# Patient Record
Sex: MALE | Race: OTHER | URBAN - METROPOLITAN AREA
[De-identification: names, ages, dates, MRNs, and addresses within clinical notes are randomized per-mention and may not be internally consistent; named-entity substitution may affect disease eponyms.]

---

## 2018-05-11 ENCOUNTER — OUTPATIENT (OUTPATIENT)
Dept: OUTPATIENT SERVICES | Facility: HOSPITAL | Age: 73
LOS: 1 days | End: 2018-05-11
Payer: COMMERCIAL

## 2018-05-11 VITALS
HEIGHT: 67.5 IN | DIASTOLIC BLOOD PRESSURE: 80 MMHG | OXYGEN SATURATION: 99 % | HEART RATE: 75 BPM | TEMPERATURE: 98 F | SYSTOLIC BLOOD PRESSURE: 125 MMHG | WEIGHT: 144.4 LBS | RESPIRATION RATE: 15 BRPM

## 2018-05-11 DIAGNOSIS — M75.122 COMPLETE ROTATOR CUFF TEAR OR RUPTURE OF LEFT SHOULDER, NOT SPECIFIED AS TRAUMATIC: ICD-10-CM

## 2018-05-11 DIAGNOSIS — M25.512 PAIN IN LEFT SHOULDER: ICD-10-CM

## 2018-05-11 DIAGNOSIS — M75.42 IMPINGEMENT SYNDROME OF LEFT SHOULDER: ICD-10-CM

## 2018-05-11 DIAGNOSIS — Z90.49 ACQUIRED ABSENCE OF OTHER SPECIFIED PARTS OF DIGESTIVE TRACT: Chronic | ICD-10-CM

## 2018-05-11 DIAGNOSIS — Z01.818 ENCOUNTER FOR OTHER PREPROCEDURAL EXAMINATION: ICD-10-CM

## 2018-05-11 LAB
ANION GAP SERPL CALC-SCNC: 5 MMOL/L — SIGNIFICANT CHANGE UP (ref 5–17)
BUN SERPL-MCNC: 15 MG/DL — SIGNIFICANT CHANGE UP (ref 7–23)
CALCIUM SERPL-MCNC: 9.1 MG/DL — SIGNIFICANT CHANGE UP (ref 8.4–10.5)
CHLORIDE SERPL-SCNC: 103 MMOL/L — SIGNIFICANT CHANGE UP (ref 96–108)
CO2 SERPL-SCNC: 30 MMOL/L — SIGNIFICANT CHANGE UP (ref 22–31)
CREAT SERPL-MCNC: 1.05 MG/DL — SIGNIFICANT CHANGE UP (ref 0.5–1.3)
GLUCOSE SERPL-MCNC: 76 MG/DL — SIGNIFICANT CHANGE UP (ref 70–99)
HCT VFR BLD CALC: 43.4 % — SIGNIFICANT CHANGE UP (ref 39–50)
HGB BLD-MCNC: 14.9 G/DL — SIGNIFICANT CHANGE UP (ref 13–17)
MCHC RBC-ENTMCNC: 29.9 PG — SIGNIFICANT CHANGE UP (ref 27–34)
MCHC RBC-ENTMCNC: 34.4 GM/DL — SIGNIFICANT CHANGE UP (ref 32–36)
MCV RBC AUTO: 87 FL — SIGNIFICANT CHANGE UP (ref 80–100)
PLATELET # BLD AUTO: 168 K/UL — SIGNIFICANT CHANGE UP (ref 150–400)
POTASSIUM SERPL-MCNC: 4.3 MMOL/L — SIGNIFICANT CHANGE UP (ref 3.5–5.3)
POTASSIUM SERPL-SCNC: 4.3 MMOL/L — SIGNIFICANT CHANGE UP (ref 3.5–5.3)
RBC # BLD: 4.99 M/UL — SIGNIFICANT CHANGE UP (ref 4.2–5.8)
RBC # FLD: 13 % — SIGNIFICANT CHANGE UP (ref 10.3–14.5)
SODIUM SERPL-SCNC: 138 MMOL/L — SIGNIFICANT CHANGE UP (ref 135–145)
WBC # BLD: 11.3 K/UL — HIGH (ref 3.8–10.5)
WBC # FLD AUTO: 11.3 K/UL — HIGH (ref 3.8–10.5)

## 2018-05-11 PROCEDURE — G0463: CPT

## 2018-05-11 PROCEDURE — 85027 COMPLETE CBC AUTOMATED: CPT

## 2018-05-11 PROCEDURE — 36415 COLL VENOUS BLD VENIPUNCTURE: CPT

## 2018-05-11 PROCEDURE — 93010 ELECTROCARDIOGRAM REPORT: CPT | Mod: NC

## 2018-05-11 PROCEDURE — 93005 ELECTROCARDIOGRAM TRACING: CPT

## 2018-05-11 PROCEDURE — 80048 BASIC METABOLIC PNL TOTAL CA: CPT

## 2018-05-11 RX ORDER — CHLORHEXIDINE GLUCONATE 213 G/1000ML
1 SOLUTION TOPICAL ONCE
Qty: 0 | Refills: 0 | Status: DISCONTINUED | OUTPATIENT
Start: 2018-05-11 | End: 2018-05-26

## 2018-05-11 NOTE — H&P PST ADULT - FAMILY HISTORY
Child  Still living? Yes, Estimated age: 41-50  Family history of rheumatoid arthritis, Age at diagnosis: Age Unknown
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
## 2018-05-14 RX ORDER — CHLORHEXIDINE GLUCONATE 213 G/1000ML
1 SOLUTION TOPICAL ONCE
Qty: 0 | Refills: 0 | Status: COMPLETED | OUTPATIENT
Start: 2018-05-18 | End: 2018-05-18

## 2018-05-15 NOTE — ASU DISCHARGE PLAN (ADULT/PEDIATRIC). - SPECIAL INSTRUCTIONS
Vertical stick exercises.Fully open and close hand multiple times daily.May remove sling to flex and extend arm at elbow.  Do not raise arm over shoulder    Physical therapy 5X/week for first week, then 4X/week for next two weeks, then 2X/week for last 3 weeks.  Leave dressing intact for 48 hours. If saturated, change dressing. Wear sling at all times.     Vertical stick exercises. Fully open and close hand multiple times daily. May remove sling to flex and extend arm at elbow. OK with forward flexion with elbow bend, no Abduction no external rotation.  Do not raise arm over shoulder    Physical therapy 5X/week for first week, then 4X/week for next two weeks, then 2X/week for last 3 weeks.  Leave dressing intact for 48 hours. If saturated, change dressing.

## 2018-05-15 NOTE — ASU DISCHARGE PLAN (ADULT/PEDIATRIC). - ACTIVITY LEVEL
no tub baths/elevate extremity/No abduction No external rotation of arm/no heavy lifting/no sports/gym/no weight bearing

## 2018-05-15 NOTE — ASU DISCHARGE PLAN (ADULT/PEDIATRIC). - NOTIFY
Swelling that continues/Fever greater than 101/Numbness, tingling/Bleeding that does not stop/Numbness, color, or temperature change to extremity

## 2018-05-15 NOTE — ASU DISCHARGE PLAN (ADULT/PEDIATRIC). - DRESSING FT
clean with alcohol, shower and apply bandaids to incisions clean with alcohol, shower and apply no occlusive bandaids to incisions

## 2018-05-15 NOTE — ASU DISCHARGE PLAN (ADULT/PEDIATRIC). - MEDICATION SUMMARY - MEDICATIONS TO TAKE
I will START or STAY ON the medications listed below when I get home from the hospital:    Percocet 5/325 oral tablet  -- 1 tab(s) by mouth every 4 hours, As Needed -for severe pain MDD:6   -- Caution federal law prohibits the transfer of this drug to any person other  than the person for whom it was prescribed.  May cause drowsiness.  Alcohol may intensify this effect.  Use care when operating dangerous machinery.  This prescription cannot be refilled.  This product contains acetaminophen.  Do not use  with any other product containing acetaminophen to prevent possible liver damage.  Using more of this medication than prescribed may cause serious breathing problems.    -- Indication: For pain    zolpidem 5 mg oral tablet  -- 1 tab(s) by mouth once a day (at bedtime), As Needed -for insomnia MDD:1    -- Caution federal law prohibits the transfer of this drug to any person other  than the person for whom it was prescribed.  May cause drowsiness.  Alcohol may intensify this effect.  Use care when operating dangerous machinery.    -- Indication: For pain

## 2018-05-17 ENCOUNTER — TRANSCRIPTION ENCOUNTER (OUTPATIENT)
Age: 73
End: 2018-05-17

## 2018-05-18 ENCOUNTER — OUTPATIENT (OUTPATIENT)
Dept: OUTPATIENT SERVICES | Facility: HOSPITAL | Age: 73
LOS: 1 days | End: 2018-05-18
Payer: COMMERCIAL

## 2018-05-18 VITALS
OXYGEN SATURATION: 99 % | DIASTOLIC BLOOD PRESSURE: 66 MMHG | SYSTOLIC BLOOD PRESSURE: 128 MMHG | HEART RATE: 80 BPM | RESPIRATION RATE: 15 BRPM

## 2018-05-18 VITALS
RESPIRATION RATE: 11 BRPM | HEIGHT: 68 IN | HEART RATE: 72 BPM | OXYGEN SATURATION: 100 % | TEMPERATURE: 98 F | SYSTOLIC BLOOD PRESSURE: 138 MMHG | WEIGHT: 139.77 LBS | DIASTOLIC BLOOD PRESSURE: 69 MMHG

## 2018-05-18 DIAGNOSIS — Z90.49 ACQUIRED ABSENCE OF OTHER SPECIFIED PARTS OF DIGESTIVE TRACT: Chronic | ICD-10-CM

## 2018-05-18 DIAGNOSIS — M75.122 COMPLETE ROTATOR CUFF TEAR OR RUPTURE OF LEFT SHOULDER, NOT SPECIFIED AS TRAUMATIC: ICD-10-CM

## 2018-05-18 DIAGNOSIS — M75.42 IMPINGEMENT SYNDROME OF LEFT SHOULDER: ICD-10-CM

## 2018-05-18 PROCEDURE — C1713: CPT

## 2018-05-18 PROCEDURE — 29827 SHO ARTHRS SRG RT8TR CUF RPR: CPT | Mod: LT

## 2018-05-18 PROCEDURE — 88304 TISSUE EXAM BY PATHOLOGIST: CPT

## 2018-05-18 PROCEDURE — 88304 TISSUE EXAM BY PATHOLOGIST: CPT | Mod: 26

## 2018-05-18 PROCEDURE — 29824 SHO ARTHRS SRG DSTL CLAVICLC: CPT | Mod: LT

## 2018-05-18 PROCEDURE — 29826 SHO ARTHRS SRG DECOMPRESSION: CPT | Mod: LT

## 2018-05-18 RX ORDER — HYDROMORPHONE HYDROCHLORIDE 2 MG/ML
0.5 INJECTION INTRAMUSCULAR; INTRAVENOUS; SUBCUTANEOUS
Qty: 0 | Refills: 0 | Status: DISCONTINUED | OUTPATIENT
Start: 2018-05-18 | End: 2018-05-18

## 2018-05-18 RX ORDER — ZOLPIDEM TARTRATE 10 MG/1
1 TABLET ORAL
Qty: 14 | Refills: 0 | OUTPATIENT
Start: 2018-05-18 | End: 2018-05-31

## 2018-05-18 RX ORDER — CEFAZOLIN SODIUM 1 G
2000 VIAL (EA) INJECTION ONCE
Qty: 0 | Refills: 0 | Status: COMPLETED | OUTPATIENT
Start: 2018-05-18 | End: 2018-05-18

## 2018-05-18 RX ORDER — OXYCODONE HYDROCHLORIDE 5 MG/1
5 TABLET ORAL ONCE
Qty: 0 | Refills: 0 | Status: DISCONTINUED | OUTPATIENT
Start: 2018-05-18 | End: 2018-05-18

## 2018-05-18 RX ORDER — ONDANSETRON 8 MG/1
4 TABLET, FILM COATED ORAL ONCE
Qty: 0 | Refills: 0 | Status: DISCONTINUED | OUTPATIENT
Start: 2018-05-18 | End: 2018-05-18

## 2018-05-18 RX ORDER — SODIUM CHLORIDE 9 MG/ML
1000 INJECTION, SOLUTION INTRAVENOUS
Qty: 0 | Refills: 0 | Status: DISCONTINUED | OUTPATIENT
Start: 2018-05-18 | End: 2018-05-18

## 2018-05-18 RX ADMIN — CHLORHEXIDINE GLUCONATE 1 APPLICATION(S): 213 SOLUTION TOPICAL at 09:29

## 2018-05-18 NOTE — BRIEF OPERATIVE NOTE - PROCEDURE
<<-----Click on this checkbox to enter Procedure Shoulder arthroscopy  05/18/2018  rotator cuff repair, subacromial decompression, synovectomy  Active  SHARON

## 2018-11-13 NOTE — H&P PST ADULT - VENOUS THROMBOEMBOLISM CURRENT STATUS
11/13/18 0622   Provider Notification   Provider Name/Title Dr. Amaro    Method of Notification Phone   Request Evaluate - Remote   Notification Reason Status Update     Notified provider that patient continues to have an elevated hr of 120. Fasting BG this am was 152. No concerns at this time.    minor surgery planned

## 2020-01-02 NOTE — H&P PST ADULT - OPH GEN HX ROS MEA POS PC
Medications:  Continuous Infusions:  Scheduled Meds:   aspirin  81 mg Oral Daily    atorvastatin  40 mg Oral Daily    docusate sodium  50 mg Oral Daily    lidocaine (PF) 10 mg/ml (1%)  1 mL Other Once    magnesium oxide  800 mg Oral Once    pregabalin  100 mg Oral BID    vancomycin (VANCOCIN) IVPB  15 mg/kg (Dosing Weight) Intravenous Q12H     PRN Meds:acetaminophen, acetaminophen, Dextrose 10% Bolus, glucagon (human recombinant), HYDROmorphone, insulin aspart U-100, melatonin, ondansetron, oxyCODONE, sodium chloride 0.9%, Pharmacy to dose Vancomycin consult **AND** vancomycin - pharmacy to dose     Objective:     Vital Signs (Most Recent):  Temp: 98.4 °F (36.9 °C) (01/02/20 0514)  Pulse: 88 (01/02/20 0749)  Resp: 19 (01/02/20 0749)  BP: 128/78 (01/02/20 0749)  SpO2: 96 % (01/02/20 0749) Vital Signs (24h Range):  Temp:  [97.2 °F (36.2 °C)-100 °F (37.8 °C)] 98.4 °F (36.9 °C)  Pulse:  [] 88  Resp:  [16-19] 19  SpO2:  [93 %-100 %] 96 %  BP: (128-171)/(69-89) 128/78         Physical Exam   Constitutional: He is oriented to person, place, and time. He appears well-developed and well-nourished.   HENT:   Head: Normocephalic and atraumatic.   Cardiovascular: Normal rate.   Pulmonary/Chest: Effort normal.   Abdominal: Soft.   Musculoskeletal:   Bilateral wound vacs to groin incisions to adequate suction    1+ palpable L PT, biphasic L DP  Multiphasic R DP/PT   Neurological: He is alert and oriented to person, place, and time.   Skin: Skin is warm and dry.   Nursing note and vitals reviewed.      Significant Labs:  BMP:   Recent Labs   Lab 01/02/20  0430   *   *   K 4.0   CL 99   CO2 26   BUN 10   CREATININE 0.8   CALCIUM 9.3   MG 1.8     CBC:   Recent Labs   Lab 01/02/20  0430   WBC 8.49   RBC 4.32*   HGB 13.4*   HCT 39.7*      MCV 92   MCH 31.0   MCHC 33.8       Significant Diagnostics:  I have reviewed all pertinent imaging results/findings within the past 24 hours.   right eye vision problems and redness

## 2023-01-18 ENCOUNTER — INPATIENT (INPATIENT)
Facility: HOSPITAL | Age: 78
LOS: 7 days | Discharge: HOME | DRG: 291 | End: 2023-01-26
Attending: INTERNAL MEDICINE | Admitting: HOSPITALIST
Payer: SELF-PAY

## 2023-01-18 VITALS
DIASTOLIC BLOOD PRESSURE: 92 MMHG | SYSTOLIC BLOOD PRESSURE: 175 MMHG | RESPIRATION RATE: 18 BRPM | OXYGEN SATURATION: 78 % | HEART RATE: 144 BPM | TEMPERATURE: 98 F

## 2023-01-18 DIAGNOSIS — E87.70 FLUID OVERLOAD, UNSPECIFIED: ICD-10-CM

## 2023-01-18 LAB
ALBUMIN SERPL ELPH-MCNC: 3.6 G/DL — SIGNIFICANT CHANGE UP (ref 3.3–5.2)
ALP SERPL-CCNC: 159 U/L — HIGH (ref 40–120)
ALT FLD-CCNC: 8 U/L — SIGNIFICANT CHANGE UP
ANION GAP SERPL CALC-SCNC: 13 MMOL/L — SIGNIFICANT CHANGE UP (ref 5–17)
AST SERPL-CCNC: 16 U/L — SIGNIFICANT CHANGE UP
BASOPHILS # BLD AUTO: 0 K/UL — SIGNIFICANT CHANGE UP (ref 0–0.2)
BASOPHILS NFR BLD AUTO: 0 % — SIGNIFICANT CHANGE UP (ref 0–2)
BILIRUB SERPL-MCNC: 0.3 MG/DL — LOW (ref 0.4–2)
BUN SERPL-MCNC: 21.5 MG/DL — HIGH (ref 8–20)
CALCIUM SERPL-MCNC: 8.1 MG/DL — LOW (ref 8.4–10.5)
CHLORIDE SERPL-SCNC: 104 MMOL/L — SIGNIFICANT CHANGE UP (ref 96–108)
CO2 SERPL-SCNC: 22 MMOL/L — SIGNIFICANT CHANGE UP (ref 22–29)
CREAT SERPL-MCNC: 3.05 MG/DL — HIGH (ref 0.5–1.3)
D DIMER BLD IA.RAPID-MCNC: 789 NG/ML DDU — HIGH
EGFR: 20 ML/MIN/1.73M2 — LOW
EOSINOPHIL # BLD AUTO: 0.29 K/UL — SIGNIFICANT CHANGE UP (ref 0–0.5)
EOSINOPHIL NFR BLD AUTO: 1.8 % — SIGNIFICANT CHANGE UP (ref 0–6)
GIANT PLATELETS BLD QL SMEAR: PRESENT — SIGNIFICANT CHANGE UP
GLUCOSE SERPL-MCNC: 176 MG/DL — HIGH (ref 70–99)
HCT VFR BLD CALC: 34.8 % — LOW (ref 39–50)
HGB BLD-MCNC: 10.9 G/DL — LOW (ref 13–17)
LYMPHOCYTES # BLD AUTO: 39.5 % — SIGNIFICANT CHANGE UP (ref 13–44)
LYMPHOCYTES # BLD AUTO: 6.32 K/UL — HIGH (ref 1–3.3)
MAGNESIUM SERPL-MCNC: 2.2 MG/DL — SIGNIFICANT CHANGE UP (ref 1.6–2.6)
MANUAL SMEAR VERIFICATION: SIGNIFICANT CHANGE UP
MCHC RBC-ENTMCNC: 27.5 PG — SIGNIFICANT CHANGE UP (ref 27–34)
MCHC RBC-ENTMCNC: 31.3 GM/DL — LOW (ref 32–36)
MCV RBC AUTO: 87.9 FL — SIGNIFICANT CHANGE UP (ref 80–100)
MONOCYTES # BLD AUTO: 0.42 K/UL — SIGNIFICANT CHANGE UP (ref 0–0.9)
MONOCYTES NFR BLD AUTO: 2.6 % — SIGNIFICANT CHANGE UP (ref 2–14)
NEUTROPHILS # BLD AUTO: 8.56 K/UL — HIGH (ref 1.8–7.4)
NEUTROPHILS NFR BLD AUTO: 53.5 % — SIGNIFICANT CHANGE UP (ref 43–77)
NT-PROBNP SERPL-SCNC: 6457 PG/ML — HIGH (ref 0–300)
PLAT MORPH BLD: NORMAL — SIGNIFICANT CHANGE UP
PLATELET # BLD AUTO: 343 K/UL — SIGNIFICANT CHANGE UP (ref 150–400)
POTASSIUM SERPL-MCNC: 3.4 MMOL/L — LOW (ref 3.5–5.3)
POTASSIUM SERPL-SCNC: 3.4 MMOL/L — LOW (ref 3.5–5.3)
PROCALCITONIN SERPL-MCNC: 0.09 NG/ML — SIGNIFICANT CHANGE UP (ref 0.02–0.1)
PROT SERPL-MCNC: 7.9 G/DL — SIGNIFICANT CHANGE UP (ref 6.6–8.7)
RAPID RVP RESULT: SIGNIFICANT CHANGE UP
RBC # BLD: 3.96 M/UL — LOW (ref 4.2–5.8)
RBC # FLD: 16.8 % — HIGH (ref 10.3–14.5)
RBC BLD AUTO: NORMAL — SIGNIFICANT CHANGE UP
SARS-COV-2 RNA SPEC QL NAA+PROBE: SIGNIFICANT CHANGE UP
SMUDGE CELLS # BLD: PRESENT — SIGNIFICANT CHANGE UP
SODIUM SERPL-SCNC: 139 MMOL/L — SIGNIFICANT CHANGE UP (ref 135–145)
TROPONIN T SERPL-MCNC: 0.02 NG/ML — SIGNIFICANT CHANGE UP (ref 0–0.06)
VARIANT LYMPHS # BLD: 2.6 % — SIGNIFICANT CHANGE UP (ref 0–6)
WBC # BLD: 16 K/UL — HIGH (ref 3.8–10.5)
WBC # FLD AUTO: 16 K/UL — HIGH (ref 3.8–10.5)

## 2023-01-18 PROCEDURE — 99223 1ST HOSP IP/OBS HIGH 75: CPT

## 2023-01-18 PROCEDURE — 99285 EMERGENCY DEPT VISIT HI MDM: CPT

## 2023-01-18 PROCEDURE — 99497 ADVNCD CARE PLAN 30 MIN: CPT | Mod: 25

## 2023-01-18 PROCEDURE — 93010 ELECTROCARDIOGRAM REPORT: CPT

## 2023-01-18 PROCEDURE — 71045 X-RAY EXAM CHEST 1 VIEW: CPT | Mod: 26

## 2023-01-18 RX ORDER — ONDANSETRON 8 MG/1
4 TABLET, FILM COATED ORAL EVERY 8 HOURS
Refills: 0 | Status: DISCONTINUED | OUTPATIENT
Start: 2023-01-18 | End: 2023-01-26

## 2023-01-18 RX ORDER — AZITHROMYCIN 500 MG/1
500 TABLET, FILM COATED ORAL ONCE
Refills: 0 | Status: COMPLETED | OUTPATIENT
Start: 2023-01-18 | End: 2023-01-18

## 2023-01-18 RX ORDER — HEPARIN SODIUM 5000 [USP'U]/ML
5000 INJECTION INTRAVENOUS; SUBCUTANEOUS EVERY 12 HOURS
Refills: 0 | Status: DISCONTINUED | OUTPATIENT
Start: 2023-01-18 | End: 2023-01-26

## 2023-01-18 RX ORDER — CEFTRIAXONE 500 MG/1
1000 INJECTION, POWDER, FOR SOLUTION INTRAMUSCULAR; INTRAVENOUS ONCE
Refills: 0 | Status: DISCONTINUED | OUTPATIENT
Start: 2023-01-18 | End: 2023-01-18

## 2023-01-18 RX ORDER — FUROSEMIDE 40 MG
40 TABLET ORAL ONCE
Refills: 0 | Status: COMPLETED | OUTPATIENT
Start: 2023-01-18 | End: 2023-01-18

## 2023-01-18 RX ORDER — POTASSIUM CHLORIDE 20 MEQ
10 PACKET (EA) ORAL ONCE
Refills: 0 | Status: COMPLETED | OUTPATIENT
Start: 2023-01-18 | End: 2023-01-18

## 2023-01-18 RX ORDER — FUROSEMIDE 40 MG
20 TABLET ORAL ONCE
Refills: 0 | Status: DISCONTINUED | OUTPATIENT
Start: 2023-01-18 | End: 2023-01-18

## 2023-01-18 RX ORDER — CEFTRIAXONE 500 MG/1
1000 INJECTION, POWDER, FOR SOLUTION INTRAMUSCULAR; INTRAVENOUS ONCE
Refills: 0 | Status: COMPLETED | OUTPATIENT
Start: 2023-01-18 | End: 2023-01-18

## 2023-01-18 RX ORDER — ACETAMINOPHEN 500 MG
650 TABLET ORAL EVERY 6 HOURS
Refills: 0 | Status: DISCONTINUED | OUTPATIENT
Start: 2023-01-18 | End: 2023-01-26

## 2023-01-18 RX ORDER — LANOLIN ALCOHOL/MO/W.PET/CERES
3 CREAM (GRAM) TOPICAL AT BEDTIME
Refills: 0 | Status: DISCONTINUED | OUTPATIENT
Start: 2023-01-18 | End: 2023-01-26

## 2023-01-18 RX ADMIN — AZITHROMYCIN 255 MILLIGRAM(S): 500 TABLET, FILM COATED ORAL at 21:36

## 2023-01-18 RX ADMIN — Medication 40 MILLIGRAM(S): at 23:17

## 2023-01-18 RX ADMIN — CEFTRIAXONE 1000 MILLIGRAM(S): 500 INJECTION, POWDER, FOR SOLUTION INTRAMUSCULAR; INTRAVENOUS at 21:37

## 2023-01-18 RX ADMIN — Medication 100 MILLIEQUIVALENT(S): at 23:17

## 2023-01-18 NOTE — ED PROVIDER NOTE - CARE PLAN
Principal Discharge DX:	Volume overload  Secondary Diagnosis:	GERSON (acute kidney injury)  Secondary Diagnosis:	Pleural effusion  Secondary Diagnosis:	PNA (pneumonia)  Secondary Diagnosis:	Hypokalemia   1

## 2023-01-18 NOTE — ED ADULT TRIAGE NOTE - CHIEF COMPLAINT QUOTE
BIBEMS from home, lives alone, reporting onset of SOB three hours PTA. Patient reports only medical history is an enlarged prostate. Patient arrived on NRB, once taken off found to be tachycardic and saturating 78% on RA. Patient also has BL LE swelling, reports that is new for him and he noticed it a few days ago. Patient lung sounds congested. MD Gonzalez aware and at bedside to further evaluate patient. EKG in progress. BIBEMS from home, lives alone, reporting onset of SOB three hours PTA. Patient reports only medical history is an enlarged prostate. Patient arrived on NRB, once taken off found to be tachycardic and saturating 78% on RA. Patient also has BL LE swelling, reports that is new for him and he noticed it a few days ago. Denies CP. Patient lung sounds congested. MD Gonzalez aware and at bedside to further evaluate patient. EKG in progress.

## 2023-01-18 NOTE — ED ADULT NURSE NOTE - CHIEF COMPLAINT QUOTE
BIBEMS from home, lives alone, reporting onset of SOB three hours PTA. Patient reports only medical history is an enlarged prostate. Patient arrived on NRB, once taken off found to be tachycardic and saturating 78% on RA. Patient also has BL LE swelling, reports that is new for him and he noticed it a few days ago. Denies CP. Patient lung sounds congested. MD Gonzalez aware and at bedside to further evaluate patient. EKG in progress.

## 2023-01-18 NOTE — ED PROVIDER NOTE - CLINICAL SUMMARY MEDICAL DECISION MAKING FREE TEXT BOX
Omar  77M patient presenting for hypoxic SOB  Exam significant for crackles bilateral lung bases, BLE edema 2+ pitting, hypoxia to 74% on RA, crackles right mid lung  Ddx includes PNA, GERSON w. fluid retention, CHF exacerbation / new CHF  Sepsis labs + CHF labs obtained for further evaluation  Medications / interventions provided included antibiotics; IVFB held over concerns of volume overload   Significant findings of CXR with pleural effusions and ?right sided infiltrate, BNP >6000, trop 0.02 negative, procal negative, creatinine ~3.0   Disposition is to medicine admission on telemetry for most likely new CHF with exacerbation though possible PNA; CT chest non-contrast ordered for further differentiation and cardiology consultation placed for new CHF, lasix 40mg ordered as well as KCl for mild hypokalemia.

## 2023-01-18 NOTE — H&P ADULT - ASSESSMENT
78 yo male with pmhx reportedly only of BPH presenting to the ED in acute hypoxic respiratory failure on NRB by EMS.     Acute Hypoxic Respiratory Failure;  Multifactorial 2/2 Acute Bacterial PNA and likely Acute onset of CHF  -Admit to medicine with telemetry monitoring  -CXR with R sided infiltrate/effusion; CT chest pending    -BNP 6,457; 3+ BL LE Edema  -Lasix 40 mg IV BID  -Check TTE  -Trend trops, CK x 3  -Check TSH/A1c/Lipid panel in the am  -Monitor electrolytes, replete to maintain K > 4.0, Mg > 2.0  -Strict Is&Os, Daily weights    -SIRS criteria met: WBC 16K, , RR 30  -Ceftriaxone and Azithromycin for gram negative and atypical coverage for CAP  -Follow up blood cultures, sputum culture, legionella and strep  -Chest PT, incentive spirometry     GERSON, unknown baseline  -Cr 3.05, no baseline for comparison as patient has not seen doctors  -Check Urine studies inclusing UA, Uosm, Arron, UCr  -Check Renal ultrasound  -Possibly cardiorenal component in the setting of acute CHF  -Monitor BMP    BPH  -Continue Flomax    DVTppx: Heparin  GOC: 76 yo male with pmhx reportedly only of BPH presenting to the ED in acute hypoxic respiratory failure on NRB by EMS.     Acute Hypoxic Respiratory Failure;  Multifactorial 2/2 Acute Bacterial PNA and likely Acute onset of CHF  -Admit to medicine with telemetry monitoring  -CXR with R sided infiltrate/effusion; CT chest with moderate pleural effusions BL, pulmonary edema, and cardiomegaly    -BNP 6,457; 3+ BL LE Edema  -Lasix 40 mg IV BID  -Check TTE  -Trend trops, CK x 3; uptrending trop 0.02 -> 0.10  -Check TSH/A1c/Lipid panel in the am  -Monitor electrolytes, replete to maintain K > 4.0, Mg > 2.0  -Strict Is&Os, Daily weights  -Cardiology consulted pending results    -SIRS criteria met: WBC 16K, , RR 30  -Ceftriaxone and Azithromycin for gram negative and atypical coverage for CAP  -Follow up blood cultures, sputum culture, legionella and strep  -Chest PT, incentive spirometry     GERSON, unknown baseline; BL hydronephrosis, Obstructive uropathy  -Cr 3.05, no baseline for comparison as patient has not seen doctors  -CT ab/pel showing BL hydronephrosis; likely obstruction 2/2 BPH  -Place Ulrich catheter to relieve obstruction  -Check Urine studies including UA, Uosm, Arron, UCr  -Check Renal ultrasound  -Possibly cardiorenal component in the setting of acute CHF  -Monitor BMP    BPH  -Continue Flomax  -Ulrich placed for obstruction    DVTppx: Heparin  GOC: Full Code 76 yo male with pmhx reportedly only of BPH presenting to the ED in acute hypoxic respiratory failure on NRB by EMS.     Acute Hypoxic Respiratory Failure;  Multifactorial 2/2 Acute Bacterial PNA and likely Acute onset of CHF  -Admit to medicine with telemetry monitoring  -CXR with R sided infiltrate/effusion; CT chest with moderate pleural effusions BL, pulmonary edema, and cardiomegaly    -BNP 6,457; 3+ BL LE Edema  -Lasix 40 mg IV BID  -Check TTE  -Trend trops, CK x 3; uptrending trop 0.02 -> 0.10  -Check TSH/A1c/Lipid panel in the am  -Monitor electrolytes, replete to maintain K > 4.0, Mg > 2.0  -Strict Is&Os, Daily weights  -Cardiology consulted pending results    -SIRS criteria met: WBC 16K, , RR 30  -Ceftriaxone and Azithromycin for gram negative and atypical coverage for CAP  -Follow up blood cultures, sputum culture, legionella and strep  -Chest PT, incentive spirometry     GERSON, unknown baseline; BL hydronephrosis, Obstructive uropathy  -Cr 3.05, no baseline for comparison, but patient does state he has been told he has impaired kidney function in the past; though, he has never been referred to nephrology  -CT ab/pel showing BL hydronephrosis; likely obstruction 2/2 BPH  -Place Ulrich catheter to relieve obstruction  -Check Urine studies including UA, Uosm, Arron, UCr  -Check Renal ultrasound  -Possibly cardiorenal component in the setting of acute CHF  -Monitor BMP    BPH  -Continue Flomax  -Ulrich placed for obstruction    DVTppx: Heparin  GOC: Full Code

## 2023-01-18 NOTE — ED ADULT NURSE NOTE - DOES PATIENT HAVE ADVANCE DIRECTIVE
ORTHO NOTE    [x] Pt seen/examined.  [x] Pt without any complaints/in NAD. Pain is controlled. Denies CP, SOB, N/V, tactile fevers.       ROS: [ ] Fever  [ ] Chills  [ ] CP [ ] SOB [ ] Dysnea  [ ] Palpitations [ ] Cough [ ] N/V/C/D [ ] Paresthia [ ] Other     [ ] ROS  otherwise negative    .    PHYSICAL EXAM:  Dressing C/D/I  EHL/FHL/TA/GS 5/5 motor strength bilateral lower extremities  SLT in tact and equal to distal bilateral lower extremities  DP pulses 2+ bilaterally     Vital Signs Last 24 Hrs  T(C): 37 (17 Nov 2017 09:12), Max: 37.4 (16 Nov 2017 16:10)  T(F): 98.6 (17 Nov 2017 09:12), Max: 99.4 (16 Nov 2017 16:10)  HR: 63 (17 Nov 2017 09:12) (63 - 84)  BP: 118/55 (17 Nov 2017 09:12) (94/52 - 118/55)  BP(mean): --  RR: 16 (17 Nov 2017 09:12) (16 - 16)  SpO2: 97% (17 Nov 2017 09:12) (95% - 99%)    I&O's Detail    16 Nov 2017 07:01  -  17 Nov 2017 07:00  --------------------------------------------------------  IN:    Oral Fluid: 1200 mL  Total IN: 1200 mL    OUT:    Voided: 1960 mL  Total OUT: 1960 mL    Total NET: -760 mL      17 Nov 2017 07:01  -  17 Nov 2017 11:18  --------------------------------------------------------  IN:    Oral Fluid: 360 mL  Total IN: 360 mL    OUT:    Voided: 360 mL  Total OUT: 360 mL    Total NET: 0 mL      LABS                        9.9    13.1  )-----------( 267      ( 17 Nov 2017 07:46 )             30.4                                11-17    135  |  97  |  19  ----------------------------<  113<H>  4.4   |  26  |  0.83    Ca    8.5      17 Nov 2017 07:46        [ ] Other Labs  [ ] None ordered        ASSESSMENT/PLAN: 61F POD 3 s/p right TKR     CONTINUE:          [ ] PT clearance pending     [ ] DVT PPX-  BID    [ ] Pain Mgt    [ ] Dispo plan- [facility] No

## 2023-01-18 NOTE — ED PROVIDER NOTE - NS ED ROS FT
General: No fevers / chills  HENT: No ear pain, runny nose, or sore throat  Eyes: No visual changes  CP: No chest pain, palpitations, or lightheadedness, + lower extremity edema   Resp:+ shortness of breath, no cough,   GI: No abdominal pain, diarrhea, constipation, nausea, or vomiting  : No dysuria or hematuria  Neuro: No numbness, tingling, or weakness  Endo: No hx of diabetes  Heme: No hx of easy bleeding or bruising

## 2023-01-18 NOTE — H&P ADULT - NSHPLABSRESULTS_GEN_ALL_CORE
CARDIAC MARKERS ( 18 Jan 2023 20:20 )  x     / 0.02 ng/mL / x     / x     / x                                10.9   16.00 )-----------( 343      ( 18 Jan 2023 20:20 )             34.8     18 Jan 2023 20:20    139    |  104    |  21.5   ----------------------------<  176    3.4     |  22.0   |  3.05     Ca    8.1        18 Jan 2023 20:20  Mg     2.2       18 Jan 2023 20:20    TPro  7.9    /  Alb  3.6    /  TBili  0.3    /  DBili  x      /  AST  16     /  ALT  8      /  AlkPhos  159    18 Jan 2023 20:20      CAPILLARY BLOOD GLUCOSE        LIVER FUNCTIONS - ( 18 Jan 2023 20:20 )  Alb: 3.6 g/dL / Pro: 7.9 g/dL / ALK PHOS: 159 U/L / ALT: 8 U/L / AST: 16 U/L / GGT: x CARDIAC MARKERS ( 18 Jan 2023 20:20 )  x     / 0.02 ng/mL / x     / x     / x                                10.9   16.00 )-----------( 343      ( 18 Jan 2023 20:20 )             34.8     18 Jan 2023 20:20    139    |  104    |  21.5   ----------------------------<  176    3.4     |  22.0   |  3.05     Ca    8.1        18 Jan 2023 20:20  Mg     2.2       18 Jan 2023 20:20    TPro  7.9    /  Alb  3.6    /  TBili  0.3    /  DBili  x      /  AST  16     /  ALT  8      /  AlkPhos  159    18 Jan 2023 20:20      CAPILLARY BLOOD GLUCOSE        LIVER FUNCTIONS - ( 18 Jan 2023 20:20 )  Alb: 3.6 g/dL / Pro: 7.9 g/dL / ALK PHOS: 159 U/L / ALT: 8 U/L / AST: 16 U/L / GGT: x    < from: CT Chest No Cont (01.19.23 @ 00:27) >    IMPRESSION:  1. Moderate volume bilateral pleural effusions, partially loculated on the  left.  2. Cardiomegaly.  3. Extensive multifocal partially calcified pulmonary scarring in the left  lung. Multifocal bilateral subsegmental and dependent/compressive   atelectasis.  Equivocal mild ground-glass airspace opacity in the right lower raises the  possibility of but is not definitely diagnostic of pneumonia.  4. Small volume perihepatic and perisplenic ascites.  5. Incompletely visualized mild symmetric bilateral hydronephrosis.  6. Mildly prominent mediastinal lymph nodes measuring up to 1.1 cm in   short axis, indeterminate.  7. Equivocal mild smooth interlobular septal thickening raises   possibility of mild hydrostatic interstitial pulmonary edema/CHF.    < end of copied text >

## 2023-01-18 NOTE — H&P ADULT - HISTORY OF PRESENT ILLNESS
76 yo male with pmhx reportedly only of BPH presenting to the ED in acute hypoxic respiratory failure on NRB by EMS. Patient reports SOB started suddenly tonight, prompting him to call EMS.  78 yo male with pmhx reportedly only of BPH presenting to the ED in acute hypoxic respiratory failure on NRB by EMS. Patient reports SOB started suddenly tonight, prompting him to call EMS. He denies any shortness of breath in the recent week. Only associated symptom is progressive LE edema over the past 4 days. He had never experience SOB like this, nor has he ever had LE edema in the past. He follows with [facility], states last appointment was about 2 months ago. He has been told he has kidney disease in the past, however, he does not know his baseline Cr and has never been referred to a nephrologist. He has also never been seen by a cardiologist in the past. He is feeling improved since being in the ED, now on NC. He denies chest pain, abdominal pain, dizziness, F/C, N/V/D.

## 2023-01-18 NOTE — ED ADULT NURSE NOTE - OBJECTIVE STATEMENT
Pt. c/o SOB, onset today.  Denies fever/flu sxs/CP.  No sick contacts. Pt. states living alone.  Hx. of enlarged prostate. Pt. c/o SOB, onset today.  States bilateral lower extremety swelling x 4 days.  Denies fever/flu sxs/CP.  No sick contacts. Pt. states living alone.  Hx. of enlarged prostate.

## 2023-01-18 NOTE — H&P ADULT - NSHPPHYSICALEXAM_GEN_ALL_CORE
Vital Signs Last 24 Hrs  T(C): 36.8 (18 Jan 2023 22:30), Max: 36.8 (18 Jan 2023 22:30)  T(F): 98.3 (18 Jan 2023 22:30), Max: 98.3 (18 Jan 2023 22:30)  HR: 107 (18 Jan 2023 22:30) (102 - 144)  BP: 125/74 (18 Jan 2023 22:30) (125/74 - 175/92)  BP(mean): --  RR: 24 (18 Jan 2023 22:30) (18 - 30)  SpO2: 97% (18 Jan 2023 22:30) (78% - 100%)    Parameters below as of 18 Jan 2023 22:30  Patient On (Oxygen Delivery Method): nasal cannula  O2 Flow (L/min): 5      General: Age-appearing, in no acute distress  Head: Normochephalic, atraumatic  ENMT: EOMI, neck supple  Cardiovascular: +S1, S2; Regular rate and rhythm, no murmurs, rubs, gallops  Respiratory: CTA BL, no wheezes, rales, rhonchi  Gastrointestinal: Abdomen soft, non-tender, +BS in all 4 quadrants  Extremities: No clubbing, cyanosis, or edema  Vascular: 2+ pulses, cap refill < 2 seconds  Neuro: Non-focal, AAOx4, sensation intact BL  Musculoskeletal: Normal tone, no deformities  Skin: Warm, dry; no acute rash seen  Psych: Appropriate, cooperative Vital Signs Last 24 Hrs  T(C): 36.8 (18 Jan 2023 22:30), Max: 36.8 (18 Jan 2023 22:30)  T(F): 98.3 (18 Jan 2023 22:30), Max: 98.3 (18 Jan 2023 22:30)  HR: 107 (18 Jan 2023 22:30) (102 - 144)  BP: 125/74 (18 Jan 2023 22:30) (125/74 - 175/92)  BP(mean): --  RR: 24 (18 Jan 2023 22:30) (18 - 30)  SpO2: 97% (18 Jan 2023 22:30) (78% - 100%)    Parameters below as of 18 Jan 2023 22:30  Patient On (Oxygen Delivery Method): nasal cannula  O2 Flow (L/min): 5    General: Age-appearing, in no acute distress; frail appearing  Head: Normocephalic, atraumatic  ENMT: EOMI, neck supple  Cardiovascular: +S1, S2; Regular rate and rhythm, no murmurs, rubs, gallops; 3+ pitting edema in BL LE  Respiratory: Diffuse wheeze; increased work of breathing  Gastrointestinal: Abdomen soft, non-tender, +BS in all 4 quadrants  Extremities: No clubbing, cyanosis, 3+ pitting edema in BL LE to the level of the knee  Vascular: 2+ pulses, cap refill < 2 seconds  Neuro: Non-focal, AAOx4, sensation intact BL  Musculoskeletal: Normal tone, no deformities  Skin: Warm, dry; no acute rash seen  Psych: Appropriate, cooperative

## 2023-01-18 NOTE — ED PROVIDER NOTE - OBJECTIVE STATEMENT
76 y/o male presents to the ED c/o SOB and lower extremity edema. Pt notes he has had new lower extremity edema over the past few days, he states SOB started suddenly earlier tonight prompting him to call EMS.

## 2023-01-18 NOTE — ED PROVIDER NOTE - PHYSICAL EXAMINATION
Gen: chronically ill appearing  Eyes: PERRLA, EOMI   HENT: Normocephalic, atraumatic. External ears normal, no rhinorrhea, moist mucous membranes.   CV: tachycardic, regular rhythm, no M/R/G, + pitting bilateral lower extremity edema, worse on left  Resp: + crackles at bases  Abd: soft, non tender, non rigid, no guarding or rebound tenderness  Back: No CVAT bilaterally, no midline ttp  Skin: dry, wwp   Neuro: AOx3, speech is fluent and appropriate  Psych: Mood ***, affect euthymic

## 2023-01-19 DIAGNOSIS — N35.919 UNSPECIFIED URETHRAL STRICTURE, MALE, UNSPECIFIED SITE: ICD-10-CM

## 2023-01-19 DIAGNOSIS — Z90.49 ACQUIRED ABSENCE OF OTHER SPECIFIED PARTS OF DIGESTIVE TRACT: Chronic | ICD-10-CM

## 2023-01-19 DIAGNOSIS — J90 PLEURAL EFFUSION, NOT ELSEWHERE CLASSIFIED: ICD-10-CM

## 2023-01-19 DIAGNOSIS — N40.1 BENIGN PROSTATIC HYPERPLASIA WITH LOWER URINARY TRACT SYMPTOMS: ICD-10-CM

## 2023-01-19 DIAGNOSIS — I50.9 HEART FAILURE, UNSPECIFIED: ICD-10-CM

## 2023-01-19 LAB
A1C WITH ESTIMATED AVERAGE GLUCOSE RESULT: 5.1 % — SIGNIFICANT CHANGE UP (ref 4–5.6)
ALBUMIN FLD-MCNC: 1 G/DL — SIGNIFICANT CHANGE UP
ANION GAP SERPL CALC-SCNC: 11 MMOL/L — SIGNIFICANT CHANGE UP (ref 5–17)
APPEARANCE UR: CLEAR — SIGNIFICANT CHANGE UP
B PERT IGG+IGM PNL SER: CLEAR — SIGNIFICANT CHANGE UP
BACTERIA # UR AUTO: ABNORMAL
BASOPHILS # BLD AUTO: 0.04 K/UL — SIGNIFICANT CHANGE UP (ref 0–0.2)
BASOPHILS NFR BLD AUTO: 0.3 % — SIGNIFICANT CHANGE UP (ref 0–2)
BILIRUB UR-MCNC: NEGATIVE — SIGNIFICANT CHANGE UP
BUN SERPL-MCNC: 23.7 MG/DL — HIGH (ref 8–20)
CALCIUM SERPL-MCNC: 8 MG/DL — LOW (ref 8.4–10.5)
CHLORIDE SERPL-SCNC: 104 MMOL/L — SIGNIFICANT CHANGE UP (ref 96–108)
CHOLEST SERPL-MCNC: 140 MG/DL — SIGNIFICANT CHANGE UP
CK MB CFR SERPL CALC: 6 NG/ML — SIGNIFICANT CHANGE UP (ref 0–6.7)
CK SERPL-CCNC: 143 U/L — SIGNIFICANT CHANGE UP (ref 30–200)
CK SERPL-CCNC: 166 U/L — SIGNIFICANT CHANGE UP (ref 30–200)
CO2 SERPL-SCNC: 25 MMOL/L — SIGNIFICANT CHANGE UP (ref 22–29)
COLOR FLD: YELLOW
COLOR SPEC: YELLOW — SIGNIFICANT CHANGE UP
CREAT ?TM UR-MCNC: 54 MG/DL — SIGNIFICANT CHANGE UP
CREAT SERPL-MCNC: 3.09 MG/DL — HIGH (ref 0.5–1.3)
DIFF PNL FLD: ABNORMAL
EGFR: 20 ML/MIN/1.73M2 — LOW
EOSINOPHIL # BLD AUTO: 0 K/UL — SIGNIFICANT CHANGE UP (ref 0–0.5)
EOSINOPHIL NFR BLD AUTO: 0 % — SIGNIFICANT CHANGE UP (ref 0–6)
EPI CELLS # UR: SIGNIFICANT CHANGE UP
ESTIMATED AVERAGE GLUCOSE: 100 MG/DL — SIGNIFICANT CHANGE UP (ref 68–114)
GLUCOSE FLD-MCNC: 136 MG/DL — SIGNIFICANT CHANGE UP
GLUCOSE SERPL-MCNC: 127 MG/DL — HIGH (ref 70–99)
GLUCOSE UR QL: NEGATIVE MG/DL — SIGNIFICANT CHANGE UP
GRAM STN FLD: SIGNIFICANT CHANGE UP
HCT VFR BLD CALC: 33.4 % — LOW (ref 39–50)
HCV AB S/CO SERPL IA: 0.16 S/CO — SIGNIFICANT CHANGE UP (ref 0–0.99)
HCV AB SERPL-IMP: SIGNIFICANT CHANGE UP
HDLC SERPL-MCNC: 42 MG/DL — SIGNIFICANT CHANGE UP
HGB BLD-MCNC: 10.5 G/DL — LOW (ref 13–17)
IMM GRANULOCYTES NFR BLD AUTO: 0.6 % — SIGNIFICANT CHANGE UP (ref 0–0.9)
KETONES UR-MCNC: NEGATIVE — SIGNIFICANT CHANGE UP
LDH SERPL L TO P-CCNC: 222 U/L — HIGH (ref 98–192)
LDH SERPL L TO P-CCNC: 76 U/L — SIGNIFICANT CHANGE UP
LEGIONELLA AG UR QL: NEGATIVE — SIGNIFICANT CHANGE UP
LEUKOCYTE ESTERASE UR-ACNC: NEGATIVE — SIGNIFICANT CHANGE UP
LIPID PNL WITH DIRECT LDL SERPL: 82 MG/DL — SIGNIFICANT CHANGE UP
LYMPHOCYTES # BLD AUTO: 1.06 K/UL — SIGNIFICANT CHANGE UP (ref 1–3.3)
LYMPHOCYTES # BLD AUTO: 8.4 % — LOW (ref 13–44)
LYMPHOCYTES # FLD: 67 % — SIGNIFICANT CHANGE UP
MAGNESIUM SERPL-MCNC: 2.2 MG/DL — SIGNIFICANT CHANGE UP (ref 1.6–2.6)
MCHC RBC-ENTMCNC: 27.4 PG — SIGNIFICANT CHANGE UP (ref 27–34)
MCHC RBC-ENTMCNC: 31.4 GM/DL — LOW (ref 32–36)
MCV RBC AUTO: 87.2 FL — SIGNIFICANT CHANGE UP (ref 80–100)
MESOTHL CELL # FLD: 4 % — SIGNIFICANT CHANGE UP
MONOCYTES # BLD AUTO: 0.75 K/UL — SIGNIFICANT CHANGE UP (ref 0–0.9)
MONOCYTES NFR BLD AUTO: 5.9 % — SIGNIFICANT CHANGE UP (ref 2–14)
MONOS+MACROS # FLD: 18 % — SIGNIFICANT CHANGE UP
MRSA PCR RESULT.: SIGNIFICANT CHANGE UP
NEUTROPHILS # BLD AUTO: 10.71 K/UL — HIGH (ref 1.8–7.4)
NEUTROPHILS NFR BLD AUTO: 84.8 % — HIGH (ref 43–77)
NEUTROPHILS-BODY FLUID: 11 % — SIGNIFICANT CHANGE UP
NITRITE UR-MCNC: NEGATIVE — SIGNIFICANT CHANGE UP
NON HDL CHOLESTEROL: 98 MG/DL — SIGNIFICANT CHANGE UP
OSMOLALITY UR: 244 MOSM/KG — LOW (ref 300–1000)
PH FLD: 8 — SIGNIFICANT CHANGE UP
PH UR: 6 — SIGNIFICANT CHANGE UP (ref 5–8)
PLATELET # BLD AUTO: 282 K/UL — SIGNIFICANT CHANGE UP (ref 150–400)
POTASSIUM SERPL-MCNC: 4 MMOL/L — SIGNIFICANT CHANGE UP (ref 3.5–5.3)
POTASSIUM SERPL-SCNC: 4 MMOL/L — SIGNIFICANT CHANGE UP (ref 3.5–5.3)
POTASSIUM UR-SCNC: 16 MMOL/L — SIGNIFICANT CHANGE UP
PROT ?TM UR-MCNC: 16 MG/DL — HIGH (ref 0–12)
PROT FLD-MCNC: 2 G/DL — SIGNIFICANT CHANGE UP
PROT UR-MCNC: 15 MG/DL
PROT/CREAT UR-RTO: 0.3 RATIO — HIGH
RBC # BLD: 3.83 M/UL — LOW (ref 4.2–5.8)
RBC # FLD: 16.8 % — HIGH (ref 10.3–14.5)
RBC CASTS # UR COMP ASSIST: ABNORMAL /HPF (ref 0–4)
RCV VOL RI: < 2000 /UL — SIGNIFICANT CHANGE UP (ref 0–0)
S AUREUS DNA NOSE QL NAA+PROBE: SIGNIFICANT CHANGE UP
SODIUM SERPL-SCNC: 140 MMOL/L — SIGNIFICANT CHANGE UP (ref 135–145)
SODIUM UR-SCNC: 68 MMOL/L — SIGNIFICANT CHANGE UP
SP GR SPEC: 1.01 — SIGNIFICANT CHANGE UP (ref 1.01–1.02)
SPECIMEN SOURCE: SIGNIFICANT CHANGE UP
TOTAL NUCLEATED CELL COUNT, BODY FLUID: 178 /UL — SIGNIFICANT CHANGE UP
TRIGL SERPL-MCNC: 81 MG/DL — SIGNIFICANT CHANGE UP
TROPONIN T SERPL-MCNC: 0.08 NG/ML — HIGH (ref 0–0.06)
TROPONIN T SERPL-MCNC: 0.1 NG/ML — HIGH (ref 0–0.06)
TROPONIN T SERPL-MCNC: 0.12 NG/ML — HIGH (ref 0–0.06)
TSH SERPL-MCNC: 1.22 UIU/ML — SIGNIFICANT CHANGE UP (ref 0.27–4.2)
UROBILINOGEN FLD QL: NEGATIVE MG/DL — SIGNIFICANT CHANGE UP
UUN UR-MCNC: 190 MG/DL — SIGNIFICANT CHANGE UP
WBC # BLD: 12.63 K/UL — HIGH (ref 3.8–10.5)
WBC # FLD AUTO: 12.63 K/UL — HIGH (ref 3.8–10.5)
WBC UR QL: SIGNIFICANT CHANGE UP /HPF (ref 0–5)

## 2023-01-19 PROCEDURE — 71045 X-RAY EXAM CHEST 1 VIEW: CPT | Mod: 26

## 2023-01-19 PROCEDURE — 99223 1ST HOSP IP/OBS HIGH 75: CPT

## 2023-01-19 PROCEDURE — 88112 CYTOPATH CELL ENHANCE TECH: CPT | Mod: 26

## 2023-01-19 PROCEDURE — 88305 TISSUE EXAM BY PATHOLOGIST: CPT | Mod: 26

## 2023-01-19 PROCEDURE — 76775 US EXAM ABDO BACK WALL LIM: CPT | Mod: 26

## 2023-01-19 PROCEDURE — 51703 INSERT BLADDER CATH COMPLEX: CPT

## 2023-01-19 PROCEDURE — 99233 SBSQ HOSP IP/OBS HIGH 50: CPT

## 2023-01-19 PROCEDURE — 93971 EXTREMITY STUDY: CPT | Mod: 26,LT

## 2023-01-19 PROCEDURE — 93010 ELECTROCARDIOGRAM REPORT: CPT

## 2023-01-19 PROCEDURE — 93306 TTE W/DOPPLER COMPLETE: CPT | Mod: 26

## 2023-01-19 PROCEDURE — 99254 IP/OBS CNSLTJ NEW/EST MOD 60: CPT

## 2023-01-19 PROCEDURE — 32557 INSERT CATH PLEURA W/ IMAGE: CPT

## 2023-01-19 PROCEDURE — 71250 CT THORAX DX C-: CPT | Mod: 26

## 2023-01-19 PROCEDURE — 99252 IP/OBS CONSLTJ NEW/EST SF 35: CPT | Mod: 25

## 2023-01-19 PROCEDURE — 99255 IP/OBS CONSLTJ NEW/EST HI 80: CPT

## 2023-01-19 PROCEDURE — 52000 CYSTOURETHROSCOPY: CPT

## 2023-01-19 PROCEDURE — 99223 1ST HOSP IP/OBS HIGH 75: CPT | Mod: 25

## 2023-01-19 RX ORDER — CEFTRIAXONE 500 MG/1
1000 INJECTION, POWDER, FOR SOLUTION INTRAMUSCULAR; INTRAVENOUS EVERY 24 HOURS
Refills: 0 | Status: COMPLETED | OUTPATIENT
Start: 2023-01-19 | End: 2023-01-20

## 2023-01-19 RX ORDER — FUROSEMIDE 40 MG
40 TABLET ORAL
Refills: 0 | Status: DISCONTINUED | OUTPATIENT
Start: 2023-01-19 | End: 2023-01-20

## 2023-01-19 RX ORDER — AZITHROMYCIN 500 MG/1
500 TABLET, FILM COATED ORAL EVERY 24 HOURS
Refills: 0 | Status: COMPLETED | OUTPATIENT
Start: 2023-01-19 | End: 2023-01-20

## 2023-01-19 RX ORDER — LIDOCAINE HCL 20 MG/ML
5 VIAL (ML) INJECTION ONCE
Refills: 0 | Status: DISCONTINUED | OUTPATIENT
Start: 2023-01-19 | End: 2023-01-26

## 2023-01-19 RX ORDER — TAMSULOSIN HYDROCHLORIDE 0.4 MG/1
0.4 CAPSULE ORAL AT BEDTIME
Refills: 0 | Status: DISCONTINUED | OUTPATIENT
Start: 2023-01-19 | End: 2023-01-26

## 2023-01-19 RX ORDER — INFLUENZA VIRUS VACCINE 15; 15; 15; 15 UG/.5ML; UG/.5ML; UG/.5ML; UG/.5ML
0.7 SUSPENSION INTRAMUSCULAR ONCE
Refills: 0 | Status: COMPLETED | OUTPATIENT
Start: 2023-01-19 | End: 2023-01-19

## 2023-01-19 RX ADMIN — HEPARIN SODIUM 5000 UNIT(S): 5000 INJECTION INTRAVENOUS; SUBCUTANEOUS at 18:00

## 2023-01-19 RX ADMIN — Medication 40 MILLIGRAM(S): at 14:24

## 2023-01-19 RX ADMIN — Medication 40 MILLIGRAM(S): at 05:31

## 2023-01-19 RX ADMIN — TAMSULOSIN HYDROCHLORIDE 0.4 MILLIGRAM(S): 0.4 CAPSULE ORAL at 21:06

## 2023-01-19 RX ADMIN — HEPARIN SODIUM 5000 UNIT(S): 5000 INJECTION INTRAVENOUS; SUBCUTANEOUS at 05:32

## 2023-01-19 RX ADMIN — AZITHROMYCIN 255 MILLIGRAM(S): 500 TABLET, FILM COATED ORAL at 21:06

## 2023-01-19 RX ADMIN — Medication 3 MILLIGRAM(S): at 21:06

## 2023-01-19 RX ADMIN — CEFTRIAXONE 1000 MILLIGRAM(S): 500 INJECTION, POWDER, FOR SOLUTION INTRAMUSCULAR; INTRAVENOUS at 21:07

## 2023-01-19 NOTE — CONSULT NOTE ADULT - ASSESSMENT
77y  Male with h/o BPH, presented to ED on 1/18 c/o shortness of breath.  Found to be in acute hypoxic respiratory failure.  Patient states shortness of breath began acutely 1 day prior to presentation and noticed LE edema recently.  No previous diagnosis of CHF in the past.  States he was told he may have kidney disease but does not know baseline creatinine.  Given lasix for diuresis.  Attempted goyal by ED and urology but unable to place, now s/p Goyal placement by urology via cystoscopy. On 6L NC.  Has been afebrile. Initially with leukocytosis to 16k. CT Chest reporting B/L pleural effusions. Started on Azithromycin and Ceftriaxone.       Acute hypoxic respiratory failure  Leukocytosis  GERSON  Pleural effusion   s/p chest tube      - Blood cultures pending  - RVP/COVID 19 PCR 1/18 negative   - CT Chest with pleural effusions, L>R   - UA negative for UTI   - Procalcitonin level 0.09  - Follow up pleural fluid studies  - Check LDH   - TTE pending  - Check Urine for Legionella   - Continue Azithromycin  - Continue Ceftriaxone   - Follow up cultures  - Trend Fever  - Trend WBC      Will Follow    
77 year old male with PMH reportedly consisting only of BPH presents with SOB. Admitted for acute hypoxic respiratory failure in setting of PNA complicated by b/l effusions, partially loculated on the left + decompensated heart failure. Also found to have b/l hydronephrosis. Hospital course is notable for R chest tube placement on 01/19 and goyal placement on 01/19. TTE showed severe mitral valve regurg + mild pulmonary HTN + diastolic dysfunction. Nephrology is consulted for GERSON on ? CKD.      -Baseline creatinine is unclear  -Patient reported that he was informed by his PCP of "abnormal renal function" several months ago  -Has consultation office visit with a nephrologist at [facility] that is upcoming / pending as per patient   -While here, creatinine have peaked at 3mg/dL  -UA 15 protein, large blood, 11-25 RBC, occasional bacteria   -Suspecting hematuria to be due to traumatic goyal as there have been multiple attempts at goyal placement as per documentation   -CT showed b/l hydronephrosis   -Follow up renal ultrasound showed R kidney 10.9cm + L kidney 9.9cm + b/l hydronephrosis   -s/p goyal placement on 01/19/23 - non oliguric   -Anticipate that renal function will improve given placement of goyal - unclear where creatinine will settle though as patient has CKD (unclear baseline)   -Blood pressure acceptable   -Metabolic acidosis in setting of CKD - serum bicarb is WNL  -Anemia - hemoglobin is acceptable   -Mineral Bone Disease in setting of CKD - corrected calcium is mildly low; will check phos, PTH and 25 Vitamin D  -B/l hydronephrosis - s/p goyal - management of goyal as per Urology     Ranulfo Tafoya DO  Nephrology  [facility]  Office Number [phone]
77yMale with h/o BPH, presented to ED on 1/18 c/o shortness of breath.  BIBEMS on NRB.  Found to be in acute hypoxic respiratory failure.  Patient states shortness of breath began acutely the previous day and noticed mild LE edema recently.  No previous diagnosis of CHF in the past.  States he was told he may have kidney disease but does not know baseline creatinine.  Given lasix for diuresis.  Pt with decreased urine output.  Attempted goyal by ED and urology but unable to place.  States currently feeling better on 6L NC.  Thoracic surgery consulted for bilateral pleural effusions.
Patient is a pablo 78 y/o M coming in with respiratory distress, bacterial pna and new onset CHF found to have partially visualized bilateral mild hydronephrosis. I attempted goyal placement x2 with no success, patient is currently no in extremis ( bladder scan 377) and does have the urge to void, did void @150cc prior to my arrival, Discussed with Dr. Nicholas.  plan:  trend labs  goyal to be placed by am team  
This is 78 y/o male with hx of BPH, ?CKD, current smoker who presents with SOB and leg swelling. Pt states that he noticed his feet and ankles swelling up 4 days ago and he started having trouble breathing yesterday. In the ED pt was found very hypoxemic with SpO2 74% on room air. Labs show creatinine 3.05, D-dimer 789, troponin negative and proBNP 6457. EKG shows  bpm with no acute ST/T changes. Pt denies chest pain or palpitations. He denies any prior cardiac issues and does not have a cardiologist.

## 2023-01-19 NOTE — PROCEDURE NOTE - ADDITIONAL PROCEDURE DETAILS
pt with urinary retention, prior goyal attempts unsuccessful  Bedside cystoscopy performed  area prepped and draped in usual sterile fashion, flexible cystoscope used  proximal urethral stricture noted, false passages noted  difficulty accessing wire into bladder  urethra dilated to 16fr using dilators  14Fr goyal placed, clear yellow urine drained    secured in place with stat lock    do not remove goyal unless advised by urology team

## 2023-01-19 NOTE — PROCEDURE NOTE - NSICDXPROBLEMMLMBOX_GEN
IPSTART~^~1~^~[id number]~^~~^~IPEND  PKSTART~^~[id number]~^~PKEND  IPSTART~^~2~^~[id number]~^~~^~IPEND  PKSTART~^~[id number]~^~PKEND

## 2023-01-19 NOTE — PATIENT PROFILE ADULT - FUNCTIONAL ASSESSMENT - BASIC MOBILITY 6.
3-calculated by average/Not able to assess (calculate score using [facility] averaging method)

## 2023-01-19 NOTE — PROGRESS NOTE ADULT - SUBJECTIVE AND OBJECTIVE BOX
[facility] Division of Hospital Medicine    Chief Complaint: Acute hypoxic respiratory failure 2/2 Bacterial PNA and new onset CHF     SUBJECTIVE / OVERNIGHT EVENTS: No acute events overnight. Patient continues to require NC 5L.     Patient denies chest pain, SOB, abd pain, N/V, fever, chills, dysuria or any other complaints. All remainder ROS negative.     MEDICATIONS  (STANDING):  azithromycin  IVPB 500 milliGRAM(s) IV Intermittent every 24 hours  cefTRIAXone Injectable. 1000 milliGRAM(s) IV Push every 24 hours  furosemide   Injectable 40 milliGRAM(s) IV Push two times a day  heparin   Injectable 5000 Unit(s) SubCutaneous every 12 hours  lidocaine 2% Jelly 5 milliLiter(s) IntraUrethral once  tamsulosin 0.4 milliGRAM(s) Oral at bedtime    MEDICATIONS  (PRN):  acetaminophen     Tablet .. 650 milliGRAM(s) Oral every 6 hours PRN Temp greater or equal to 38C (100.4F), Mild Pain (1 - 3)  aluminum hydroxide/magnesium hydroxide/simethicone Suspension 30 milliLiter(s) Oral every 4 hours PRN Dyspepsia  melatonin 3 milliGRAM(s) Oral at bedtime PRN Insomnia  ondansetron Injectable 4 milliGRAM(s) IV Push every 8 hours PRN Nausea and/or Vomiting        I&O's Summary    2023 07:01  -  2023 07:00  --------------------------------------------------------  IN: 0 mL / OUT: 400 mL / NET: -400 mL        PHYSICAL EXAM:  Vital Signs Last 24 Hrs  T(C): 36.5 (2023 07:37), Max: 36.8 (2023 22:30)  T(F): 97.7 (2023 07:37), Max: 98.3 (2023 22:30)  HR: 85 (2023 07:37) (85 - 144)  BP: 127/61 (2023 07:37) (125/74 - 175/92)  BP(mean): --  RR: 20 (2023 07:37) (18 - 30)  SpO2: 98% (2023 07:37) (78% - 100%)    Parameters below as of 2023 07:37  Patient On (Oxygen Delivery Method): nasal cannula  O2 Flow (L/min): 5      General: NAD; frail appearing  Cardiovascular: +S1, S2; Regular rate and rhythm, no murmurs, rubs, gallops; 3+ pitting edema in BL LE  Respiratory: Diffuse wheeze; increased work of breathing  Gastrointestinal: Abdomen soft, non-tender, +BS in all 4 quadrants  Extremities: No clubbing, cyanosis, 3+ pitting edema in BL LE to the level of the knee  Neuro: Non-focal, AAOx4, sensation intact b/l    LABS:                        10.5   12.63 )-----------( 282      ( 2023 03:59 )             33.4     -    140  |  104  |  23.7<H>  ----------------------------<  127<H>  4.0   |  25.0  |  3.09<H>    Ca    8.0<L>      2023 03:59  Mg     2.2     -    TPro  7.9  /  Alb  3.6  /  TBili  0.3<L>  /  DBili  x   /  AST  16  /  ALT  8   /  AlkPhos  159<H>  -18      CARDIAC MARKERS ( 2023 03:59 )  x     / 0.12 ng/mL / 143 U/L / x     / x      CARDIAC MARKERS ( 2023 01:07 )  x     / 0.10 ng/mL / 166 U/L / x     / 6.0 ng/mL  CARDIAC MARKERS ( 2023 20:20 )  x     / 0.02 ng/mL / x     / x     / x          Urinalysis Basic - ( 2023 09:30 )    Color: Yellow / Appearance: Clear / S.010 / pH: x  Gluc: x / Ketone: Negative  / Bili: Negative / Urobili: Negative mg/dL   Blood: x / Protein: 15 mg/dL / Nitrite: Negative   Leuk Esterase: Negative / RBC: 11-25 /HPF / WBC 0-2 /HPF   Sq Epi: x / Non Sq Epi: Occasional / Bacteria: Occasional        CAPILLARY BLOOD GLUCOSE            RADIOLOGY & ADDITIONAL TESTS:  Results Reviewed:   Imaging Personally Reviewed:  < from: US Duplex Venous Lower Ext Ltd, Left (23 @ 10:24) >  IMPRESSION:  No evidence of left lower extremity deep venous thrombosis.          --- End of Report ---            EDDIE CHINO MD; Attending Radiologist  This document has been electronically signed. 2023 10:34AM    < end of copied text >  < from: US Renal (23 @ 10:23) >  IMPRESSION:  Moderate bilateral hydronephrosis.    Urinary bladder is collapsed around a Ulrich catheter.        --- End of Report ---            EDDIE CHINO MD; Attending Radiologist  This document has been electronically signed. 1:23AM    < end of copied text >      Electrocardiogram Personally Reviewed:

## 2023-01-19 NOTE — CHART NOTE - NSCHARTNOTEFT_GEN_A_CORE
Pt with urinary retention, voiding small amounts of urine  multiple prior attempts of goyal catheter insertion unsuccessful  Bedside cystoscopy performed  14 Fr goyal catheter placed with difficulty  see procedure note    do not remove goyal unless advised by urology team

## 2023-01-19 NOTE — PROCEDURE NOTE - NSINDICATIONS_GEN_A_CORE
altered anatomy/bladder distention/history of difficult urethral catheterization/strict intake/output during critical illness
pleural effusion
pleural effusion

## 2023-01-19 NOTE — PROVIDER CONTACT NOTE (OTHER) - SITUATION
Attempted to put in 16F goyal, unsuccessful. RN #2 (Marlon) Attempted to put in 12F goyal, unsuccessful. Provider aware. Recc to call urology to place goyal, MD will call urology to discuss

## 2023-01-19 NOTE — CONSULT NOTE ADULT - PROBLEM SELECTOR RECOMMENDATION 9
-telemetry monitoring  -Lasix 40 mg IV bid  -strict I&Os  -daily weights  -monitor renal function and electrolytes with diuresis  -TTE in am  -supplemental O2  -smoking cessation  -ischemic evaluation
POCUS with fluid pocket in right chest  Right pigtail placed, 400 cc serous fluid drained  CXR with proper placement, no PTX  Maintain right pigtail to water seal  CXR each AM while chest tube is in place  Record drainage q12 hours  Follow up pleural fluid studies  Rest of care per primary team    Plan to be discussed with attending Thoracic Surgeons in AM rounds

## 2023-01-19 NOTE — CONSULT NOTE ADULT - SUBJECTIVE AND OBJECTIVE BOX
[facility]  INFECTIOUS DISEASES at [place] and [place]  =======================================================                               Bobby Guerrero MD#   Shaggy Ny MD*                             Renata Jovel MD*   Lashell Sandoval MD*            Diplomates American Board of Internal Medicine & Infectious Diseases                # [place] Office - Appt - Tel  [phone] Fax [phone]                * [place] Office - Appt - Tel [phone] Fax [phone]                                  Hospital Consult line:  [phone]  =======================================================      [id number]  RAMON ADINA    CC: Patient is a 77y old  Male who presents with a chief complaint of Acute hypoxic respiratory failure 2/2 Bacterial PNA and new onset CHF (2023 06:22)      77y  Male with h/o BPH, presented to ED on  c/o shortness of breath.  Found to be in acute hypoxic respiratory failure.  Patient states shortness of breath began acutely 1 day prior to presentation and noticed LE edema recently.  No previous diagnosis of CHF in the past.  States he was told he may have kidney disease but does not know baseline creatinine.  Given lasix for diuresis.  Attempted goyal by ED and urology but unable to place, now s/p Goyal placement by urology via cystoscopy. On 6L NC.  Has been afebrile. Initially with leukocytosis to 16k. CT Chest reporting B/L pleural effusions. Started on Azithromycin and Ceftriaxone.  ID input requested.       Past Medical & Surgical Hx:  BPH (benign prostatic hyperplasia)  History of appendectomy      Social Hx:  + Smoker       FAMILY HISTORY:  No family history of hypertension  Enlarged Prostate - Sibling       Allergies  No Known Allergies       REVIEW OF SYSTEMS:  CONSTITUTIONAL:  No Fever or chills  HEENT:  No diplopia or blurred vision.  No earache, sore throat or runny nose.  CARDIOVASCULAR:  No chest pain  RESPIRATORY:  No cough. + shortness of breath  GASTROINTESTINAL:  No nausea, vomiting or diarrhea.  GENITOURINARY:  No dysuria, frequency or urgency. No Blood in urine  MUSCULOSKELETAL:  no joint aches, no muscle pain  SKIN:  No change in skin, hair or nails.  NEUROLOGIC:  No Headaches, seizures  PSYCHIATRIC:  No disorder of thought or mood.  ENDOCRINE:  No heat or cold intolerance  HEMATOLOGICAL:  No easy bruising or bleeding.       Physical Exam:  GEN: NAD  HEENT: normocephalic and atraumatic. EOMI. PERRL.    NECK: Supple.   LUNGS: decreased basal BS B/L. + Chest tube   HEART: RRR  ABDOMEN: Soft, NT, ND.  +BS.    : No CVA tenderness  EXTREMITIES: + edema.  MSK: No joint swelling  NEUROLOGIC: No Focal Deficits   PSYCHIATRIC: Appropriate affect .  SKIN: No rash      Vitals:  T(F): 97.7 (2023 07:37), Max: 98.3 (2023 22:30)  HR: 85 (2023 07:37)  BP: 127/61 (2023 07:37)  RR: 20 (2023 07:37)  SpO2: 98% (2023 07:37) (78% - 100%)  temp max in last 48H T(F): , Max: 98.3 (23 @ 22:30)      Current Antibiotics:  azithromycin  IVPB 500 milliGRAM(s) IV Intermittent every 24 hours  cefTRIAXone Injectable. 1000 milliGRAM(s) IV Push every 24 hours    Other medications:  furosemide   Injectable 40 milliGRAM(s) IV Push two times a day  heparin   Injectable 5000 Unit(s) SubCutaneous every 12 hours  lidocaine 2% Jelly 5 milliLiter(s) IntraUrethral once  tamsulosin 0.4 milliGRAM(s) Oral at bedtime                            10.5   12.63 )-----------( 282      ( 2023 03:59 )             33.4         140  |  104  |  23.7<H>  ----------------------------<  127<H>  4.0   |  25.0  |  3.09<H>    Ca    8.0<L>      2023 03:59  Mg     2.2         TPro  7.9  /  Alb  3.6  /  TBili  0.3<L>  /  DBili  x   /  AST  16  /  ALT  8   /  AlkPhos  159<H>      RECENT CULTURES:   @ 20:30    RVP  NotDete      WBC Count: 12.63 K/uL (23 @ 03:59)  WBC Count: 16.00 K/uL (23 @ 20:20)    Creatinine, Serum: 3.09 mg/dL (23 @ 03:59)  Creatinine, Serum: 3.05 mg/dL (23 @ 20:20)    Procalcitonin, Serum: 0.09 ng/mL (23 @ 20:20)     SARS-CoV-2: NotDetec (23 @ 20:30)      Urinalysis Basic - ( 2023 09:30 )    Color: Yellow / Appearance: Clear / S.010 / pH: x  Gluc: x / Ketone: Negative  / Bili: Negative / Urobili: Negative mg/dL   Blood: x / Protein: 15 mg/dL / Nitrite: Negative   Leuk Esterase: Negative / RBC: 11-25 /HPF / WBC 0-2 /HPF   Sq Epi: x / Non Sq Epi: Occasional / Bacteria: Occasional          < from: US Renal (23 @ 10:23) >  ACC: [id number] EXAM:  US KIDNEY(S)   ORDERED BY: MACKENZIE LOPEZ     PROCEDURE DATE:  2023      INTERPRETATION:  CLINICAL INFORMATION: Acute renal insufficiency.    COMPARISON: Chest CT dated 2023    TECHNIQUE: Sonography of the kidneys and bladder.    FINDINGS:  Right kidney: 10.9 cm. Normal in size. Moderate increased echogenicity.   There is moderate right hydronephrosis. No focal lesions or stones are   identified.    Left kidney: 9.9 cm. Normal in size. Moderate increased echogenicity.   There is moderate right hydronephrosis. No focal lesions or stones are   identified.    Urinary bladder: Urinary bladder is collapsed about a Goyal catheter   limiting its evaluation.    Incidentally noted are mild bilateral pleural effusions.    IMPRESSION:  Moderate bilateral hydronephrosis.    Urinary bladder is collapsed around a Goyal catheter.    --- End of Report ---  < end of copied text >        < from: CT Chest No Cont (23 @ 00:27) >  ACC: [id number] EXAM:  CT CHEST                          PROCEDURE DATE:  2023      INTERPRETATION:  Clinical indication: Abnormal xray - pleural effusion    TECHNIQUE:  Imaging protocol: Diagnostic computed tomography of the chest without   contrast.  3D rendering (Not supervised by radiologist): MIP and/or 3D reconstructed  images were created by the technologist.    COMPARISON: No prior CT    FINDINGS:  Tubes, catheters and devices: Study limited by large field of view and   motion.    Lungs: Pulmonary scarring within the apical posterior left upper lobe and   superior segment of left lower lobe. Interval wall septal thickening and   patchy groundglass opacity at the right lower lobe. Bibasilar atelectasis.  Pleural spaces: Moderate volume bilateral pleural effusions, partially   loculated on the left. Bilateral pleural calcifications.  Heart: Cardiomegaly. Dilated left atrium.  Lymph nodes: Mildly prominent mediastinal lymph nodes measuring up to 1.1   cm in short axis.  Vasculature: Dilated pulmonary arteries.    Liver: Small volume perihepatic and perisplenic ascites.  Kidneys and ureters: Incompletely visualized mild symmetric bilateral  hydronephrosis.  Bones/joints: Unremarkable. No acute fracture.  Soft tissues: Unremarkable.    IMPRESSION:  Moderate volume bilateral pleural effusions, partially loculated on the   left. Interlobular septal thickening representing interstitial pulmonary   edema/CHF.  Small volume perihepatic and perisplenic ascites.  Incompletely visualized mild symmetric bilateral hydronephrosis.    --- End of Report ---  < end of copied text >

## 2023-01-19 NOTE — CONSULT NOTE ADULT - SUBJECTIVE AND OBJECTIVE BOX
SUBJECTIVE    HPI 77yMale with h/o BPH, presented to ED on 1/18 c/o shortness of breath.  BIBEMS on NRB.  Found to be in acute hypoxic respiratory failure.  Patient states shortness of breath began acutely yesterday and noticed mild LE edema recently.  No previous diagnosis of CHF in the past.  States he was told he may have kidney disease but does not know baseline creatinine.  Pt with decreased urine output.  Attempted goyal by ED and urology but unable to place.  States currently feeling better on 6L NC.  Denies h/o lung disease.  Denies f/c, n/v, chest pain, sob, abdominal pain, d/c, urinary symptoms.      LAST BLOOD THINNER--> heparin   Injectable   5000 Unit(s) SubCutaneous (01-19-23 @ 05:32)        SOCIAL HISTORY   patient lives with ; stairs; assist devices  smoking history   drinking history   previous occupation         PAST MEDICAL / SURGICAL HISTORY   BPH (benign prostatic hyperplasia)      History of appendectomy        HOME MEDICATIONS  Home Medications:  Flomax 0.4 mg oral capsule: 1 cap(s) orally once a day (19 Jan 2023 01:49)      ALLERGIES  No Known Allergies        OBJECTIVE DATA    VITALS   currently in sinus rhythm  ICU Vital Signs Last 24 Hrs  T(C): 36.7 (19 Jan 2023 04:40), Max: 36.8 (18 Jan 2023 22:30)  T(F): 98 (19 Jan 2023 04:40), Max: 98.3 (18 Jan 2023 22:30)  HR: 105 (19 Jan 2023 04:40) (102 - 144)  BP: 135/79 (19 Jan 2023 04:40) (125/74 - 175/92)  RR: 20 (19 Jan 2023 04:40) (18 - 30)  SpO2: 100% (19 Jan 2023 04:40) (78% - 100%)    O2 Parameters below as of 19 Jan 2023 04:40  Patient On (Oxygen Delivery Method): nasal cannula  O2 Flow (L/min): 5        LABS                         10.5   12.63 )-----------( 282      ( 19 Jan 2023 03:59 )             33.4   01-19    140  |  104  |  23.7<H>  ----------------------------<  127<H>  4.0   |  25.0  |  3.09<H>    Ca    8.0<L>      19 Jan 2023 03:59  Mg     2.2     01-19    TPro  7.9  /  Alb  3.6  /  TBili  0.3<L>  /  DBili  x   /  AST  16  /  ALT  8   /  AlkPhos  159<H>  01-18  CARDIAC MARKERS ( 19 Jan 2023 03:59 )  x     / 0.12 ng/mL / 143 U/L / x     / x      CARDIAC MARKERS ( 19 Jan 2023 01:07 )  x     / 0.10 ng/mL / 166 U/L / x     / 6.0 ng/mL  CARDIAC MARKERS ( 18 Jan 2023 20:20 )  x     / 0.02 ng/mL / x     / x     / x        Serum Pro-Brain Natriuretic Peptide: 6457 pg/mL (01-18-23 @ 20:20)        Physical Exam    Neuro: A+O x 3, non-focal, speech clear and intact  HEENT: PERRL, EOMI, oral mucosa pink and moist  Neck: supple, no JVD  CV: regular rate, regular rhythm, +S1S2, no murmurs or rub  Pulm/chest: lung sounds CTA and equal bilaterally, no accessory muscle use noted  Abd: soft, NT, ND, +BS  Ext: CHRISTIANSEN x 4, no C/C/E  Skin: warm, well perfused, no rashes        IMAGING   personally reviewed imaging       Noncontrast CT Chest 1/19/23    < from: CT Chest No Cont (01.19.23 @ 00:27) >    ******PRELIMINARY REPORT******       ACC: [id number] EXAM:  CT CHEST                          PROCEDURE DATE:  01/19/2023    ******PRELIMINARY REPORT******      ******PRELIMINARY REPORT******           INTERPRETATION:  [facility] RADIOLOGIST PRELIMINARY REPORT    PROCEDURE INFORMATION:  Exam: CT Chest Without Contrast; Diagnostic  Exam date and time: 1/19/2023 12:15 AM  Age: 77 years old  Clinical indication: Abnormal xray - pleural effusion    TECHNIQUE:  Imaging protocol: Diagnostic computed tomography of the chest without   contrast.  3D rendering (Not supervised by radiologist): MIP and/or 3D reconstructed  images were created by the technologist.    COMPARISON:  DX XR CHEST URGENT 1/18/2023 8:58 PM    FINDINGS:  Tubes, catheters and devices: Study limited by large field of view and   motion.    Lungs: Extensive multifocal partially calcified pulmonary scarring in the   left  lung. Multifocal bilateral subsegmental and dependent/compressive   atelectasis.  Equivocal mild ground-glass airspace opacity in the right lower raises the  possibility of but is not definitely diagnostic of pneumonia. Equivocal   mild  smooth interlobular septal thickening raises possibility of mild   hydrostatic  interstitial pulmonary edema/CHF.  Pleural spaces: Moderate volume bilateral pleural effusions, partially  loculated on the left.  Heart: Cardiomegaly.  Lymph nodes: Mildly prominent mediastinal lymph nodes measuring up to 1.1   cm in  short axis, indeterminate.  Vasculature: Unremarkable. No aortic aneurysm.    Liver: Small volume perihepatic and perisplenic ascites.  Kidneys and ureters: Incompletely visualized mild symmetric bilateral  hydronephrosis.  Bones/joints: Unremarkable. No acute fracture.  Soft tissues: Unremarkable.    IMPRESSION:  1. Moderate volume bilateral pleural effusions, partially loculated on the  left.  2. Cardiomegaly.  3. Extensive multifocal partially calcified pulmonary scarring in the left  lung. Multifocal bilateral subsegmental and dependent/compressive   atelectasis.  Equivocal mild ground-glass airspace opacity in the right lower raises the  possibility of but is not definitely diagnostic of pneumonia.  4. Small volume perihepatic and perisplenic ascites.  5. Incompletely visualized mild symmetric bilateral hydronephrosis.  6. Mildly prominent mediastinal lymph nodes measuring up to 1.1 cm in   short  axis, indeterminate.  7. Equivocal mild smooth interlobular septal thickening raises   possibility of  mild hydrostatic interstitial pulmonary edema/CHF.        ******PRELIMINARY REPORT******      ******PRELIMINARY REPORT******         SAMUEL SHRESTHA M.D.;[facility] RADIOLOGIST  This document is a PRELIMINARY interpretation and is pending final   attending approval. Jan 19 2023  1:37AM    < end of copied text >     SUBJECTIVE    HPI 77yMale with h/o BPH, presented to ED on 1/18 c/o shortness of breath.  BIBEMS on NRB.  Found to be in acute hypoxic respiratory failure.  Patient states shortness of breath began acutely yesterday and noticed mild LE edema recently.  No previous diagnosis of CHF in the past.  States he was told he may have kidney disease but does not know baseline creatinine.  Given lasix for diuresis.  Pt with decreased urine output.  Attempted goyal by ED and urology but unable to place.  States currently feeling better on 6L NC.  Denies h/o lung disease.  Denies f/c, n/v, chest pain, sob, abdominal pain, d/c, urinary symptoms.      LAST BLOOD THINNER--> heparin   Injectable   5000 Unit(s) SubCutaneous (01-19-23 @ 05:32)        SOCIAL HISTORY   smoking history - denies  drinking history - denies        PAST MEDICAL / SURGICAL HISTORY   BPH (benign prostatic hyperplasia)      History of appendectomy        HOME MEDICATIONS  Home Medications:  Flomax 0.4 mg oral capsule: 1 cap(s) orally once a day (19 Jan 2023 01:49)      ALLERGIES  No Known Allergies        OBJECTIVE DATA    VITALS   currently in sinus rhythm  ICU Vital Signs Last 24 Hrs  T(C): 36.7 (19 Jan 2023 04:40), Max: 36.8 (18 Jan 2023 22:30)  T(F): 98 (19 Jan 2023 04:40), Max: 98.3 (18 Jan 2023 22:30)  HR: 105 (19 Jan 2023 04:40) (102 - 144)  BP: 135/79 (19 Jan 2023 04:40) (125/74 - 175/92)  RR: 20 (19 Jan 2023 04:40) (18 - 30)  SpO2: 100% (19 Jan 2023 04:40) (78% - 100%)    O2 Parameters below as of 19 Jan 2023 04:40  Patient On (Oxygen Delivery Method): nasal cannula  O2 Flow (L/min): 5        LABS                         10.5   12.63 )-----------( 282      ( 19 Jan 2023 03:59 )             33.4   01-19    140  |  104  |  23.7<H>  ----------------------------<  127<H>  4.0   |  25.0  |  3.09<H>    Ca    8.0<L>      19 Jan 2023 03:59  Mg     2.2     01-19    TPro  7.9  /  Alb  3.6  /  TBili  0.3<L>  /  DBili  x   /  AST  16  /  ALT  8   /  AlkPhos  159<H>  01-18  CARDIAC MARKERS ( 19 Jan 2023 03:59 )  x     / 0.12 ng/mL / 143 U/L / x     / x      CARDIAC MARKERS ( 19 Jan 2023 01:07 )  x     / 0.10 ng/mL / 166 U/L / x     / 6.0 ng/mL  CARDIAC MARKERS ( 18 Jan 2023 20:20 )  x     / 0.02 ng/mL / x     / x     / x        Serum Pro-Brain Natriuretic Peptide: 6457 pg/mL (01-18-23 @ 20:20)        Physical Exam    Neuro: A+O x 3, non-focal, speech clear and intact  HEENT: PERRL, EOMI, oral mucosa pink and moist  Neck: supple, no JVD  CV: regular rate, regular rhythm, +S1S2, no murmurs or rub  Pulm/chest: lung sounds CTA and equal bilaterally, no accessory muscle use noted  Abd: soft, NT, ND, +BS  Ext: CHRISTIANSEN x 4, no C/C/E  Skin: warm, well perfused, no rashes        IMAGING   personally reviewed imaging       Noncontrast CT Chest 1/19/23    < from: CT Chest No Cont (01.19.23 @ 00:27) >    ******PRELIMINARY REPORT******       ACC: [id number] EXAM:  CT CHEST                          PROCEDURE DATE:  01/19/2023    ******PRELIMINARY REPORT******      ******PRELIMINARY REPORT******           INTERPRETATION:  [facility] RADIOLOGIST PRELIMINARY REPORT    PROCEDURE INFORMATION:  Exam: CT Chest Without Contrast; Diagnostic  Exam date and time: 1/19/2023 12:15 AM  Age: 77 years old  Clinical indication: Abnormal xray - pleural effusion    TECHNIQUE:  Imaging protocol: Diagnostic computed tomography of the chest without   contrast.  3D rendering (Not supervised by radiologist): MIP and/or 3D reconstructed  images were created by the technologist.    COMPARISON:  DX XR CHEST URGENT 1/18/2023 8:58 PM    FINDINGS:  Tubes, catheters and devices: Study limited by large field of view and   motion.    Lungs: Extensive multifocal partially calcified pulmonary scarring in the   left  lung. Multifocal bilateral subsegmental and dependent/compressive   atelectasis.  Equivocal mild ground-glass airspace opacity in the right lower raises the  possibility of but is not definitely diagnostic of pneumonia. Equivocal   mild  smooth interlobular septal thickening raises possibility of mild   hydrostatic  interstitial pulmonary edema/CHF.  Pleural spaces: Moderate volume bilateral pleural effusions, partially  loculated on the left.  Heart: Cardiomegaly.  Lymph nodes: Mildly prominent mediastinal lymph nodes measuring up to 1.1   cm in  short axis, indeterminate.  Vasculature: Unremarkable. No aortic aneurysm.    Liver: Small volume perihepatic and perisplenic ascites.  Kidneys and ureters: Incompletely visualized mild symmetric bilateral  hydronephrosis.  Bones/joints: Unremarkable. No acute fracture.  Soft tissues: Unremarkable.    IMPRESSION:  1. Moderate volume bilateral pleural effusions, partially loculated on the  left.  2. Cardiomegaly.  3. Extensive multifocal partially calcified pulmonary scarring in the left  lung. Multifocal bilateral subsegmental and dependent/compressive   atelectasis.  Equivocal mild ground-glass airspace opacity in the right lower raises the  possibility of but is not definitely diagnostic of pneumonia.  4. Small volume perihepatic and perisplenic ascites.  5. Incompletely visualized mild symmetric bilateral hydronephrosis.  6. Mildly prominent mediastinal lymph nodes measuring up to 1.1 cm in   short  axis, indeterminate.  7. Equivocal mild smooth interlobular septal thickening raises   possibility of  mild hydrostatic interstitial pulmonary edema/CHF.        ******PRELIMINARY REPORT******      ******PRELIMINARY REPORT******         SAMUEL SHRESTHA M.D.;[facility] RADIOLOGIST  This document is a PRELIMINARY interpretation and is pending final   attending approval. Jan 19 2023  1:37AM    < end of copied text >

## 2023-01-19 NOTE — PROCEDURE NOTE - NSPROCDETAILS_GEN_ALL_CORE
Seldinger technique/dressing applied/secured in place/sterile dressing applied/percutaneous/thoracostomy tube placed percutaneously/ultrasound assessment of fluid (location)
sterile technique, indwelling urinary device inserted
Seldinger technique/secured in place/sterile dressing applied/thoracostomy tube placed percutaneously/ultrasound assessment of fluid (location)

## 2023-01-19 NOTE — CONSULT NOTE ADULT - SUBJECTIVE AND OBJECTIVE BOX
HPI:  76 yo male with pmhx reportedly only of BPH presenting to the ED in acute hypoxic respiratory failure on NRB by EMS. Patient reports SOB started suddenly tonight, prompting him to call EMS. He denies any shortness of breath in the recent week. Only associated symptom is progressive LE edema over the past 4 days. He had never experience SOB like this, nor has he ever had LE edema in the past. He follows with [facility] ([place], [place]), states last appointment was about 2 months ago. He has been told he has kidney disease in the past, however, he does not know his baseline Cr and has never been referred to a nephrologist. He has also never been seen by a cardiologist in the past. He is feeling improved since being in the ED, now on NC. He denies chest pain, abdominal pain, dizziness, F/C, N/V/D. CT of the chest shows partially visualized b/l mild hydronephrosis       PAST MEDICAL & SURGICAL HISTORY:  BPH (benign prostatic hyperplasia)      History of appendectomy        MEDICATIONS  (STANDING):  azithromycin  IVPB 500 milliGRAM(s) IV Intermittent every 24 hours  cefTRIAXone Injectable. 1000 milliGRAM(s) IV Push every 24 hours  furosemide   Injectable 40 milliGRAM(s) IV Push two times a day  heparin   Injectable 5000 Unit(s) SubCutaneous every 12 hours  lidocaine 2% Jelly 5 milliLiter(s) IntraUrethral once  tamsulosin 0.4 milliGRAM(s) Oral at bedtime    MEDICATIONS  (PRN):  acetaminophen     Tablet .. 650 milliGRAM(s) Oral every 6 hours PRN Temp greater or equal to 38C (100.4F), Mild Pain (1 - 3)  aluminum hydroxide/magnesium hydroxide/simethicone Suspension 30 milliLiter(s) Oral every 4 hours PRN Dyspepsia  melatonin 3 milliGRAM(s) Oral at bedtime PRN Insomnia  ondansetron Injectable 4 milliGRAM(s) IV Push every 8 hours PRN Nausea and/or Vomiting      Allergies    No Known Allergies    Intolerances        SOCIAL HISTORY:    FAMILY HISTORY:  No family history of hypertension        Vital Signs Last 24 Hrs  T(C): 36.7 (19 Jan 2023 04:40), Max: 36.8 (18 Jan 2023 22:30)  T(F): 98 (19 Jan 2023 04:40), Max: 98.3 (18 Jan 2023 22:30)  HR: 105 (19 Jan 2023 04:40) (102 - 144)  BP: 135/79 (19 Jan 2023 04:40) (125/74 - 175/92)  BP(mean): --  RR: 20 (19 Jan 2023 04:40) (18 - 30)  SpO2: 100% (19 Jan 2023 04:40) (78% - 100%)    Parameters below as of 19 Jan 2023 04:40  Patient On (Oxygen Delivery Method): nasal cannula  O2 Flow (L/min): 5      PHYSICAL EXAM:      Constitutional: in good spirits     Respiratory: mild dyspnea, audible expiratory wheezing, supplemental o2 via nasal canula    Gastrointestinal: abdomen softly distended, no guarding, left inguinal hernia present    Neurological: A&OX3            LABS:                        10.5   12.63 )-----------( 282      ( 19 Jan 2023 03:59 )             33.4     01-19    140  |  104  |  23.7<H>  ----------------------------<  127<H>  4.0   |  25.0  |  3.09<H>    Ca    8.0<L>      19 Jan 2023 03:59  Mg     2.2     01-19    TPro  7.9  /  Alb  3.6  /  TBili  0.3<L>  /  DBili  x   /  AST  16  /  ALT  8   /  AlkPhos  159<H>  01-18          RADIOLOGY & ADDITIONAL STUDIES:02

## 2023-01-19 NOTE — CONSULT NOTE ADULT - SUBJECTIVE AND OBJECTIVE BOX
[facility]                                              CARDIOLOGY AT [address], [place] [address]                                             Telephone: [phone]. Fax:[phone]                                                       CARDIOLOGY CONSULTATION NOTE                                                                                             History obtained by: Patient and medical record  Community Cardiologist: n/a   obtained: Yes [  ] No [x  ]  Reason for Consultation: new onset CHF  Available out pt records reviewed: Yes [  ] No [x  ]    Chief complaint:  "I can't breathe"      HPI:  This is 76 y/o male with hx of BPH, ?CKD, current smoker who presents with SOB and leg swelling. Pt states that he noticed his feet and ankles swelling up 4 days ago and he started having trouble breathing yesterday. In the ED pt was found very hypoxemic with SpO2 74% on room air. Labs show creatinine 3.05, D-dimer 789, troponin negative and proBNP 6457. EKG shows  bpm with no acute ST/T changes. Pt denies chest pain or palpitations. He denies any prior cardiac issues and does not have a cardiologist.      CARDIAC TESTING   ECHO: n/a    STRESS: n/a    CATH: n/a    ELECTROPHYSIOLOGY: n/a    PAST MEDICAL HISTORY  BPH (benign prostatic hyperplasia)        PAST SURGICAL HISTORY  No significant past surgical history    History of appendectomy        SOCIAL HISTORY:  lives alone  CIGARETTES: smokes 2 cigarettes a day  ALCOHOL: 2 glasses of wine daily  DRUGS: denies    FAMILY HISTORY:  No family history of hypertension      Family History of Cardiovascular Disease:  Yes [  ] No [  ]  Coronary Artery Disease in first degree relative: Yes [  ] No [  ]  Sudden Cardiac Death in First degree relative: Yes [  ] No [  ]    HOME MEDICATIONS:  Flomax 0.4 mg oral capsule: 1 cap(s) orally once a day (19 Jan 2023 01:49)      CURRENT CARDIAC MEDICATIONS:  furosemide   Injectable 40 milliGRAM(s) IV Push two times a day      CURRENT OTHER MEDICATIONS:  acetaminophen     Tablet .. 650 milliGRAM(s) Oral every 6 hours PRN Temp greater or equal to 38C (100.4F), Mild Pain (1 - 3)  melatonin 3 milliGRAM(s) Oral at bedtime PRN Insomnia  ondansetron Injectable 4 milliGRAM(s) IV Push every 8 hours PRN Nausea and/or Vomiting  aluminum hydroxide/magnesium hydroxide/simethicone Suspension 30 milliLiter(s) Oral every 4 hours PRN Dyspepsia  azithromycin  IVPB 500 milliGRAM(s) IV Intermittent every 24 hours  cefTRIAXone Injectable. 1000 milliGRAM(s) IV Push every 24 hours, Stop order after: 5 Days  heparin   Injectable 5000 Unit(s) SubCutaneous every 12 hours  tamsulosin 0.4 milliGRAM(s) Oral at bedtime      ALLERGIES:   No Known Allergies      REVIEW OF SYMPTOMS:   CONSTITUTIONAL: No fever, no chills, no weight loss, no weight gain, no fatigue   ENMT:  No vertigo; No sinus or throat pain  NECK: No pain or stiffness  CARDIOVASCULAR: as per HPI  RESPIRATORY: c/o Shortness of breath, no cough, c/o wheezing  : No dysuria, no hematuria   GI: No dark color stool, no nausea, no diarrhea, no constipation, no abdominal pain   NEURO: No headache, no slurred speech   MUSCULOSKELETAL: No joint pain or swelling; No muscle, back, or extremity pain  PSYCH: No agitation, no anxiety.    ALL OTHER REVIEW OF SYSTEMS ARE NEGATIVE.    VITAL SIGNS:  T(C): 36.8 (01-18-23 @ 22:30), Max: 36.8 (01-18-23 @ 22:30)  T(F): 98.3 (01-18-23 @ 22:30), Max: 98.3 (01-18-23 @ 22:30)  HR: 107 (01-18-23 @ 22:30) (102 - 144)  BP: 125/74 (01-18-23 @ 22:30) (125/74 - 175/92)  RR: 24 (01-18-23 @ 22:30) (18 - 30)  SpO2: 97% (01-18-23 @ 22:30) (78% - 100%)    INTAKE AND OUTPUT:       PHYSICAL EXAM:  Constitutional: in mild respiratory distress.   HEENT: Atraumatic and normocephalic , neck is supple . no JVD. No carotid bruit.  CNS: A&Ox3. No focal deficits.   Respiratory: CTAB, unlabored   Cardiovascular: tachy, normal s1 s2. No murmurs  Gastrointestinal: Soft, non-tender. +Bowel sounds.   Extremities: 2+ Peripheral Pulses, No clubbing, cyanosis, or edema  Psychiatric: Calm . no agitation.   Skin: Warm and dry, no ulcers on extremities     LABS:  ( 19 Jan 2023 01:07 )  Troponin T  0.10<H>,  CPK  166  , CKMB  X    , BNP X        , ( 18 Jan 2023 20:20 )  Troponin T  0.02 ,  CPK  X    , CKMB  X    , BNP 6457<H>                              10.9   16.00 )-----------( 343      ( 18 Jan 2023 20:20 )             34.8     01-18    139  |  104  |  21.5<H>  ----------------------------<  176<H>  3.4<L>   |  22.0  |  3.05<H>    Ca    8.1<L>      18 Jan 2023 20:20  Mg     2.2     01-18    TPro  7.9  /  Alb  3.6  /  TBili  0.3<L>  /  DBili  x   /  AST  16  /  ALT  8   /  AlkPhos  159<H>  01-18        INTERPRETATION OF TELEMETRY: 's    ECG:  bpm, no acute ST/T changes  Prior ECG: Yes [  ] No [ x ]    RADIOLOGY & ADDITIONAL STUDIES:    X-ray:    CT scan:   MRI:   US:                                                [facility]                                              CARDIOLOGY AT [address], [place] [address]                                             Telephone: [phone]. Fax:[phone]                                                       CARDIOLOGY CONSULTATION NOTE                                                                                             History obtained by: Patient and medical record  Community Cardiologist: n/a   obtained: Yes [  ] No [x  ]  Reason for Consultation: new onset CHF  Available out pt records reviewed: Yes [  ] No [x  ]    Chief complaint:  "I can't breathe"      HPI:  This is 76 y/o male with hx of BPH, ?CKD, current smoker who presents with SOB and leg swelling. Pt states that he noticed his feet and ankles swelling up 4 days ago and he started having trouble breathing yesterday. In the ED pt was found very hypoxemic with SpO2 74% on room air. Labs show creatinine 3.05, D-dimer 789, troponin negative and proBNP 6457. EKG shows  bpm with no acute ST/T changes. Pt denies chest pain or palpitations. He denies any prior cardiac issues and does not have a cardiologist.      CARDIAC TESTING   ECHO: n/a    STRESS: n/a    CATH: n/a    ELECTROPHYSIOLOGY: n/a    PAST MEDICAL HISTORY  BPH (benign prostatic hyperplasia)        PAST SURGICAL HISTORY  No significant past surgical history    History of appendectomy        SOCIAL HISTORY:  lives alone  CIGARETTES: smokes 2 cigarettes a day  ALCOHOL: 2 glasses of wine daily  DRUGS: denies    FAMILY HISTORY:  No family history of hypertension      Family History of Cardiovascular Disease:  Yes [  ] No [  ]  Coronary Artery Disease in first degree relative: Yes [  ] No [  ]  Sudden Cardiac Death in First degree relative: Yes [  ] No [  ]    HOME MEDICATIONS:  Flomax 0.4 mg oral capsule: 1 cap(s) orally once a day (19 Jan 2023 01:49)      CURRENT CARDIAC MEDICATIONS:  furosemide   Injectable 40 milliGRAM(s) IV Push two times a day      CURRENT OTHER MEDICATIONS:  acetaminophen     Tablet .. 650 milliGRAM(s) Oral every 6 hours PRN Temp greater or equal to 38C (100.4F), Mild Pain (1 - 3)  melatonin 3 milliGRAM(s) Oral at bedtime PRN Insomnia  ondansetron Injectable 4 milliGRAM(s) IV Push every 8 hours PRN Nausea and/or Vomiting  aluminum hydroxide/magnesium hydroxide/simethicone Suspension 30 milliLiter(s) Oral every 4 hours PRN Dyspepsia  azithromycin  IVPB 500 milliGRAM(s) IV Intermittent every 24 hours  cefTRIAXone Injectable. 1000 milliGRAM(s) IV Push every 24 hours, Stop order after: 5 Days  heparin   Injectable 5000 Unit(s) SubCutaneous every 12 hours  tamsulosin 0.4 milliGRAM(s) Oral at bedtime      ALLERGIES:   No Known Allergies      REVIEW OF SYMPTOMS:   CONSTITUTIONAL: No fever, no chills, no weight loss, no weight gain, no fatigue   ENMT:  No vertigo; No sinus or throat pain  NECK: No pain or stiffness  CARDIOVASCULAR: as per HPI  RESPIRATORY: c/o Shortness of breath, no cough, c/o wheezing  : No dysuria, no hematuria   GI: No dark color stool, no nausea, no diarrhea, no constipation, no abdominal pain   NEURO: No headache, no slurred speech   MUSCULOSKELETAL: No joint pain or swelling; No muscle, back, or extremity pain  PSYCH: No agitation, no anxiety.    ALL OTHER REVIEW OF SYSTEMS ARE NEGATIVE.    VITAL SIGNS:  T(C): 36.8 (01-18-23 @ 22:30), Max: 36.8 (01-18-23 @ 22:30)  T(F): 98.3 (01-18-23 @ 22:30), Max: 98.3 (01-18-23 @ 22:30)  HR: 107 (01-18-23 @ 22:30) (102 - 144)  BP: 125/74 (01-18-23 @ 22:30) (125/74 - 175/92)  RR: 24 (01-18-23 @ 22:30) (18 - 30)  SpO2: 97% (01-18-23 @ 22:30) (78% - 100%)    INTAKE AND OUTPUT:       PHYSICAL EXAM:  Constitutional: in mild respiratory distress.   HEENT: Atraumatic and normocephalic , neck is supple . no JVD. No carotid bruit.  CNS: A&Ox3. No focal deficits.   Respiratory: b/l rales and expiratory wheezes  Cardiovascular: tachy, normal s1 s2. No murmurs  Gastrointestinal: Soft, non-tender. +Bowel sounds.   Extremities: 2+ Peripheral Pulses, No clubbing, cyanosis, b/l LE +2 pitting edema  Psychiatric: Calm . no agitation.   Skin: Warm and dry, no ulcers on extremities     LABS:  ( 19 Jan 2023 01:07 )  Troponin T  0.10<H>,  CPK  166  , CKMB  X    , BNP X        , ( 18 Jan 2023 20:20 )  Troponin T  0.02 ,  CPK  X    , CKMB  X    , BNP 6457<H>                              10.9   16.00 )-----------( 343      ( 18 Jan 2023 20:20 )             34.8     01-18    139  |  104  |  21.5<H>  ----------------------------<  176<H>  3.4<L>   |  22.0  |  3.05<H>    Ca    8.1<L>      18 Jan 2023 20:20  Mg     2.2     01-18    TPro  7.9  /  Alb  3.6  /  TBili  0.3<L>  /  DBili  x   /  AST  16  /  ALT  8   /  AlkPhos  159<H>  01-18        INTERPRETATION OF TELEMETRY: 's    ECG:  bpm, no acute ST/T changes  Prior ECG: Yes [  ] No [ x ]    RADIOLOGY & ADDITIONAL STUDIES:    X-ray:    CT scan:   MRI:   US:   Yes

## 2023-01-19 NOTE — CONSULT NOTE ADULT - SUBJECTIVE AND OBJECTIVE BOX
77 year old male with PMH reportedly consisting only of BPH presents with SOB. Admitted for acute hypoxic respiratory failure in setting of PNA complicated by b/l effusions, partially loculated on the left + decompensated heart failure. Also found to have b/l hydronephrosis. Hospital course is notable for R chest tube placement on 01/19 and goyal placement on 01/19. TTE showed severe mitral valve regurg + mild pulmonary HTN + diastolic dysfunction. Nephrology is consulted for GERSON on ? CKD.      PAST MEDICAL & SURGICAL HISTORY:  BPH (benign prostatic hyperplasia)  History of appendectomy    Allergies  No Known Allergies  Intolerances    Home Medications:  Flomax 0.4 mg oral capsule: 1 cap(s) orally once a day (19 Jan 2023 01:49)    Social History:    FAMILY HISTORY:  No family history of hypertension    ROS: SOB improved     Vital Signs Last 24 Hrs  T(C): 36.5 (19 Jan 2023 16:05), Max: 36.8 (18 Jan 2023 22:30)  T(F): 97.7 (19 Jan 2023 16:05), Max: 98.3 (18 Jan 2023 22:30)  HR: 82 (19 Jan 2023 16:05) (82 - 144)  BP: 146/77 (19 Jan 2023 16:05) (125/74 - 175/92)  BP(mean): --  RR: 20 (19 Jan 2023 16:05) (18 - 30)  SpO2: 100% (19 Jan 2023 16:05) (78% - 100%)    Parameters below as of 19 Jan 2023 16:05  Patient On (Oxygen Delivery Method): nasal cannula  O2 Flow (L/min): 5    I&O's Summary    18 Jan 2023 07:01  -  19 Jan 2023 07:00  --------------------------------------------------------  IN: 0 mL / OUT: 400 mL / NET: -400 mL    19 Jan 2023 07:01  -  19 Jan 2023 18:13  --------------------------------------------------------  IN: 0 mL / OUT: 2300 mL / NET: -2300 mL    Physical Exam  General: Thin elderly male in NAD  Cardiac: S1S2 RRR  Respiratory: CTAB  Abdomen: Soft, NT  Extremities: No appreciable edema  Neuro: AAOx3    01-19    140  |  104  |  23.7<H>  ----------------------------<  127<H>  4.0   |  25.0  |  3.09<H>    Ca    8.0<L>      19 Jan 2023 03:59  Mg     2.2     01-19    TPro  7.9  /  Alb  3.6  /  TBili  0.3<L>  /  DBili  x   /  AST  16  /  ALT  8   /  AlkPhos  159<H>  01-18                  10.5   12.63 )-----------( 282      ( 19 Jan 2023 03:59 )             33.4     MEDICATIONS  (STANDING):  azithromycin  IVPB 500 milliGRAM(s) IV Intermittent every 24 hours  cefTRIAXone Injectable. 1000 milliGRAM(s) IV Push every 24 hours  furosemide   Injectable 40 milliGRAM(s) IV Push two times a day  heparin   Injectable 5000 Unit(s) SubCutaneous every 12 hours  influenza  Vaccine (HIGH DOSE) 0.7 milliLiter(s) IntraMuscular once  lidocaine 2% Jelly 5 milliLiter(s) IntraUrethral once  tamsulosin 0.4 milliGRAM(s) Oral at bedtime    MEDICATIONS  (PRN):  acetaminophen     Tablet .. 650 milliGRAM(s) Oral every 6 hours PRN Temp greater or equal to 38C (100.4F), Mild Pain (1 - 3)  aluminum hydroxide/magnesium hydroxide/simethicone Suspension 30 milliLiter(s) Oral every 4 hours PRN Dyspepsia  melatonin 3 milliGRAM(s) Oral at bedtime PRN Insomnia  ondansetron Injectable 4 milliGRAM(s) IV Push every 8 hours PRN Nausea and/or Vomiting

## 2023-01-19 NOTE — PROGRESS NOTE ADULT - ASSESSMENT
76 yo male with pmhx reportedly only of BPH presenting to the ED in acute hypoxic respiratory failure on NRB by EMS.     Acute Hypoxic Respiratory Failure;  Multifactorial 2/2 Acute Bacterial PNA and likely Acute onset of CHF  -Admit to medicine with telemetry monitoring  -CXR with R sided infiltrate/effusion; CT chest with moderate pleural effusions BL, pulmonary edema, and cardiomegaly    -BNP 6,457; 3+ BL LE Edema  -Lasix 40 mg IV BID  -Check TTE  -Trend trops, CK x 3; uptrending trop 0.02 -> 0.10  -Check TSH/A1c/Lipid panel in the am  -Monitor electrolytes, replete to maintain K > 4.0, Mg > 2.0  -Strict Is&Os, Daily weights  -Appreciate cardiology recs- c/w IV Lasix 40 BID. - Will need ischemic evaluation pending improvement in renal function  - Nephro c/s    -SIRS criteria met: WBC 16K, , RR 30  -Ceftriaxone and Azithromycin for gram negative and atypical coverage for CAP  -Follow up blood cultures, sputum culture, legionella and strep  -Chest PT, incentive spirometry   - Appreciate ID recs- will c/w CTX/AZT  - Follow up pleural fluid studies  - Appreciate thoracic recs- Will c/w RT pigtail to waterseal. F/u CXR in AM. F/u pleural studies    GERSON, unknown baseline; BL hydronephrosis, Obstructive uropathy  -Cr 3.05, no baseline for comparison, but patient does state he has been told he has impaired kidney function in the past; though, he has never been referred to nephrology  -CT ab/pel showing BL hydronephrosis; likely obstruction 2/2 BPH  -Place Goyal catheter to relieve obstruction  -Check Urine studies including UA, Uosm, Arron, UCr  -Check Renal ultrasound  -Possibly cardiorenal component in the setting of acute CHF  -Monitor BMP  - Appreciate urology recs- will c/w goyal  - US notable for moderate b/l hydronephrosis    BPH  -Continue Flomax  -Goyal placed for obstruction    DVTppx: Heparin  GOC: Full Code  Dispo pending clinical improvement. Not on home oxygen

## 2023-01-19 NOTE — PROCEDURE NOTE - NSICDXPROCEDURE_GEN_ALL_CORE_FT
PROCEDURES:  Flexible cystoscopy 19-Jan-2023 12:06:32  Danielle Mckenzie  Initial dilation of stricture of urethra in male patient 19-Jan-2023 12:06:52  Danielle Mckenzie  Complex insertion of Coude catheter 19-Jan-2023 12:07:01  Danielle Mckenzie

## 2023-01-19 NOTE — CONSULT NOTE ADULT - REASON FOR ADMISSION
Acute hypoxic respiratory failure 2/2 Bacterial PNA and new onset CHF

## 2023-01-20 DIAGNOSIS — J90 PLEURAL EFFUSION, NOT ELSEWHERE CLASSIFIED: ICD-10-CM

## 2023-01-20 DIAGNOSIS — R77.8 OTHER SPECIFIED ABNORMALITIES OF PLASMA PROTEINS: ICD-10-CM

## 2023-01-20 DIAGNOSIS — N13.30 UNSPECIFIED HYDRONEPHROSIS: ICD-10-CM

## 2023-01-20 LAB
24R-OH-CALCIDIOL SERPL-MCNC: 21.2 NG/ML — LOW (ref 30–80)
ALBUMIN SERPL ELPH-MCNC: 2.9 G/DL — LOW (ref 3.3–5.2)
ALP SERPL-CCNC: 126 U/L — HIGH (ref 40–120)
ALT FLD-CCNC: 6 U/L — SIGNIFICANT CHANGE UP
ANION GAP SERPL CALC-SCNC: 13 MMOL/L — SIGNIFICANT CHANGE UP (ref 5–17)
AST SERPL-CCNC: 13 U/L — SIGNIFICANT CHANGE UP
BILIRUB SERPL-MCNC: 0.3 MG/DL — LOW (ref 0.4–2)
BUN SERPL-MCNC: 28.9 MG/DL — HIGH (ref 8–20)
CALCIUM SERPL-MCNC: 8.5 MG/DL — SIGNIFICANT CHANGE UP (ref 8.4–10.5)
CHLORIDE SERPL-SCNC: 103 MMOL/L — SIGNIFICANT CHANGE UP (ref 96–108)
CO2 SERPL-SCNC: 25 MMOL/L — SIGNIFICANT CHANGE UP (ref 22–29)
CREAT SERPL-MCNC: 3.44 MG/DL — HIGH (ref 0.5–1.3)
CULTURE RESULTS: SIGNIFICANT CHANGE UP
EGFR: 18 ML/MIN/1.73M2 — LOW
GLUCOSE SERPL-MCNC: 106 MG/DL — HIGH (ref 70–99)
HCT VFR BLD CALC: 29.5 % — LOW (ref 39–50)
HGB BLD-MCNC: 9.4 G/DL — LOW (ref 13–17)
MCHC RBC-ENTMCNC: 27.5 PG — SIGNIFICANT CHANGE UP (ref 27–34)
MCHC RBC-ENTMCNC: 31.9 GM/DL — LOW (ref 32–36)
MCV RBC AUTO: 86.3 FL — SIGNIFICANT CHANGE UP (ref 80–100)
PLATELET # BLD AUTO: 241 K/UL — SIGNIFICANT CHANGE UP (ref 150–400)
POTASSIUM SERPL-MCNC: 3.6 MMOL/L — SIGNIFICANT CHANGE UP (ref 3.5–5.3)
POTASSIUM SERPL-SCNC: 3.6 MMOL/L — SIGNIFICANT CHANGE UP (ref 3.5–5.3)
PROT SERPL-MCNC: 6.8 G/DL — SIGNIFICANT CHANGE UP (ref 6.6–8.7)
PTH-INTACT FLD-MCNC: 69 PG/ML — HIGH (ref 15–65)
RBC # BLD: 3.42 M/UL — LOW (ref 4.2–5.8)
RBC # FLD: 16.6 % — HIGH (ref 10.3–14.5)
SODIUM SERPL-SCNC: 141 MMOL/L — SIGNIFICANT CHANGE UP (ref 135–145)
SPECIMEN SOURCE: SIGNIFICANT CHANGE UP
TROPONIN T SERPL-MCNC: 0.05 NG/ML — SIGNIFICANT CHANGE UP (ref 0–0.06)
WBC # BLD: 12.21 K/UL — HIGH (ref 3.8–10.5)
WBC # FLD AUTO: 12.21 K/UL — HIGH (ref 3.8–10.5)

## 2023-01-20 PROCEDURE — 99233 SBSQ HOSP IP/OBS HIGH 50: CPT

## 2023-01-20 PROCEDURE — 99232 SBSQ HOSP IP/OBS MODERATE 35: CPT

## 2023-01-20 PROCEDURE — 99231 SBSQ HOSP IP/OBS SF/LOW 25: CPT | Mod: 25

## 2023-01-20 PROCEDURE — 71045 X-RAY EXAM CHEST 1 VIEW: CPT | Mod: 26

## 2023-01-20 PROCEDURE — 32551 INSERTION OF CHEST TUBE: CPT | Mod: RT,24

## 2023-01-20 RX ORDER — ERGOCALCIFEROL 1.25 MG/1
50000 CAPSULE ORAL
Refills: 0 | Status: DISCONTINUED | OUTPATIENT
Start: 2023-01-20 | End: 2023-01-26

## 2023-01-20 RX ADMIN — TAMSULOSIN HYDROCHLORIDE 0.4 MILLIGRAM(S): 0.4 CAPSULE ORAL at 21:28

## 2023-01-20 RX ADMIN — Medication 40 MILLIGRAM(S): at 05:15

## 2023-01-20 RX ADMIN — CEFTRIAXONE 1000 MILLIGRAM(S): 500 INJECTION, POWDER, FOR SOLUTION INTRAMUSCULAR; INTRAVENOUS at 21:27

## 2023-01-20 RX ADMIN — HEPARIN SODIUM 5000 UNIT(S): 5000 INJECTION INTRAVENOUS; SUBCUTANEOUS at 05:14

## 2023-01-20 RX ADMIN — HEPARIN SODIUM 5000 UNIT(S): 5000 INJECTION INTRAVENOUS; SUBCUTANEOUS at 16:19

## 2023-01-20 RX ADMIN — AZITHROMYCIN 255 MILLIGRAM(S): 500 TABLET, FILM COATED ORAL at 21:28

## 2023-01-20 NOTE — PROGRESS NOTE ADULT - SUBJECTIVE AND OBJECTIVE BOX
Subjective: Patient seen and assessed for right pleural effusion. Patient states  At time of exam,     Pertinent events of the past 24 hours: Right Pigtail catheter continues to drain     VITAL SIGNS  Vital Signs Last 24 Hrs  T(C): 36.7 (01-20-23 @ 04:41), Max: 37.2 (01-19-23 @ 23:45)  T(F): 98.1 (01-20-23 @ 04:41), Max: 99 (01-19-23 @ 23:45)  HR: 80 (01-20-23 @ 04:41) (78 - 85)  BP: 126/67 (01-20-23 @ 04:41) (126/67 - 146/77)  RR: 20 (01-20-23 @ 04:41) (20 - 20)  SpO2: 98% (01-20-23 @ 04:41) (98% - 100%)  on (O2)              MEDICATIONS  acetaminophen     Tablet .. 650 milliGRAM(s) Oral every 6 hours PRN  azithromycin  IVPB 500 milliGRAM(s) IV Intermittent every 24 hours  cefTRIAXone Injectable. 1000 milliGRAM(s) IV Push every 24 hours  furosemide   Injectable 40 milliGRAM(s) IV Push two times a day  heparin   Injectable 5000 Unit(s) SubCutaneous every 12 hours  influenza  Vaccine (HIGH DOSE) 0.7 milliLiter(s) IntraMuscular once  lidocaine 2% Jelly 5 milliLiter(s) IntraUrethral once  melatonin 3 milliGRAM(s) Oral at bedtime PRN  ondansetron Injectable 4 milliGRAM(s) IV Push every 8 hours PRN  tamsulosin 0.4 milliGRAM(s) Oral at bedtime    PHYSICAL EXAM  to follow    01-19 @ 07:01  -  01-20 @ 07:00  --------------------------------------------------------  IN: 0 mL / OUT: 5330 mL / NET: -5330 mL    Weights:  Daily     Daily   Admit Wt: Drug Dosing Weight      All laboratory results, radiology and medications reviewed.    LABS  01-20    141  |  103  |  28.9<H>  ----------------------------<  106<H>  3.6   |  25.0  |  3.44<H>    Ca    8.5      20 Jan 2023 02:55  Mg     2.2     01-19    TPro  6.8  /  Alb  2.9<L>  /  TBili  0.3<L>  /  DBili  x   /  AST  13  /  ALT  6   /  AlkPhos  126<H>  01-20                                 9.4    12.21 )-----------( 241      ( 20 Jan 2023 02:55 )             29.5            < from: Xray Chest 1 View- PORTABLE-Urgent (Xray Chest 1 View- PORTABLE-Urgent .) (01.19.23 @ 06:34) >    COMPARISON: None available.    Heart magnified by technique.    There are significant perihilar infiltration right greater than left and   chronic fibrocalcific change of the apices.    There are bibasilar effusions.    Follow-up AP chest on January 19, 2023 at 6:25 AM.    Patient had a catheter right chest tube inserted.    There is diminished density at the right base. No pneumothorax. Stable   left chest findings.    IMPRESSION: Diminished right effusion after catheter chest tube.   Loculated left effusion unchanged. Central CHF type findings improved.   Other findings as above.    < end of copied text >            Subjective: Patient seen and assessed for right pleural effusion. Patient states "I feel much  better" At time of exam, Pt denies chest pain, palpitations, dizziness, headache, shortness of breath, abdominal pain or N/V/D/C.    Pertinent events of the past 24 hours: Right Pigtail catheter continues to drain     VITAL SIGNS  Vital Signs Last 24 Hrs  T(C): 36.7 (01-20-23 @ 04:41), Max: 37.2 (01-19-23 @ 23:45)  T(F): 98.1 (01-20-23 @ 04:41), Max: 99 (01-19-23 @ 23:45)  HR: 80 (01-20-23 @ 04:41) (78 - 85)  BP: 126/67 (01-20-23 @ 04:41) (126/67 - 146/77)  RR: 20 (01-20-23 @ 04:41) (20 - 20)  SpO2: 98% (01-20-23 @ 04:41) (98% - 100%)  on (O2)              MEDICATIONS  acetaminophen     Tablet .. 650 milliGRAM(s) Oral every 6 hours PRN  azithromycin  IVPB 500 milliGRAM(s) IV Intermittent every 24 hours  cefTRIAXone Injectable. 1000 milliGRAM(s) IV Push every 24 hours  furosemide   Injectable 40 milliGRAM(s) IV Push two times a day  heparin   Injectable 5000 Unit(s) SubCutaneous every 12 hours  influenza  Vaccine (HIGH DOSE) 0.7 milliLiter(s) IntraMuscular once  lidocaine 2% Jelly 5 milliLiter(s) IntraUrethral once  melatonin 3 milliGRAM(s) Oral at bedtime PRN  ondansetron Injectable 4 milliGRAM(s) IV Push every 8 hours PRN  tamsulosin 0.4 milliGRAM(s) Oral at bedtime    PHYSICAL EXAM  Constitutional: NAD  Neuro: A+O x 3, non-focal, speech clear and intact  Pulm/chest: lung sounds CTA and equal bilaterally, no accessory muscle use noted  Ext: CHRISTIANSEN x 4, no edema  Skin: warm, well perfused  Psych: calm, appropriate affect  Drains: Right PT to H2O seal draining serous fluid, no air leak, no SQ AIR, c/d/i     01-19 @ 07:01  -  01-20 @ 07:00  --------------------------------------------------------  IN: 0 mL / OUT: 5330 mL / NET: -5330 mL    Weights:  Daily     Daily   Admit Wt: Drug Dosing Weight      All laboratory results, radiology and medications reviewed.    LABS  01-20    141  |  103  |  28.9<H>  ----------------------------<  106<H>  3.6   |  25.0  |  3.44<H>    Ca    8.5      20 Jan 2023 02:55  Mg     2.2     01-19    TPro  6.8  /  Alb  2.9<L>  /  TBili  0.3<L>  /  DBili  x   /  AST  13  /  ALT  6   /  AlkPhos  126<H>  01-20                                 9.4    12.21 )-----------( 241      ( 20 Jan 2023 02:55 )             29.5            < from: Xray Chest 1 View- PORTABLE-Urgent (Xray Chest 1 View- PORTABLE-Urgent .) (01.19.23 @ 06:34) >    COMPARISON: None available.    Heart magnified by technique.    There are significant perihilar infiltration right greater than left and   chronic fibrocalcific change of the apices.    There are bibasilar effusions.    Follow-up AP chest on January 19, 2023 at 6:25 AM.    Patient had a catheter right chest tube inserted.    There is diminished density at the right base. No pneumothorax. Stable   left chest findings.    IMPRESSION: Diminished right effusion after catheter chest tube.   Loculated left effusion unchanged. Central CHF type findings improved.   Other findings as above.    < end of copied text >

## 2023-01-20 NOTE — PROGRESS NOTE ADULT - ASSESSMENT
78 yo male with urinary retention difficult goyal placement, s/p cystoscopy, dilation of urethral stricture, PNA, CHF, GERSON  - observe for post-obstructive diuresis  - keep goyal in place for now  - cont flomax  - OOB to chair  - trend renal fcn, ?baseline

## 2023-01-20 NOTE — PROGRESS NOTE ADULT - PROBLEM SELECTOR PLAN 1
POCUS with fluid pocket in right chest  Right pigtail placed 1/19  Maintain right pigtail to water seal  CXR each AM while chest tube is in place  Record drainage q12 hours  transudative by lights criteria  Will f/u pleural culture and cytology   Rest of care per primary team  Plan discussed with Thoracic Surgery team in AM rounds

## 2023-01-20 NOTE — PROGRESS NOTE ADULT - SUBJECTIVE AND OBJECTIVE BOX
Subjective:77yMale with h/o BPH, admitted with PNA, CHF, urinary retention.  S/p bedside cystoscopy, urethral dilation, placement of goyal catheter.  Goyal draining well, 5100ml output after goyal placed    Goyal: yellow    Vital Signs Last 24 Hrs  T(C): 36.9 (20 Jan 2023 07:48), Max: 37.2 (19 Jan 2023 23:45)  T(F): 98.5 (20 Jan 2023 07:48), Max: 99 (19 Jan 2023 23:45)  HR: 74 (20 Jan 2023 07:48) (74 - 82)  BP: 144/74 (20 Jan 2023 07:48) (126/67 - 146/77)  BP(mean): --  RR: 18 (20 Jan 2023 07:48) (18 - 20)  SpO2: 100% (20 Jan 2023 07:48) (98% - 100%)    Parameters below as of 20 Jan 2023 07:48  Patient On (Oxygen Delivery Method): nasal cannula  O2 Flow (L/min): 2    I&O's Detail    19 Jan 2023 07:01  -  20 Jan 2023 07:00  --------------------------------------------------------  IN:  Total IN: 0 mL    OUT:    Chest Tube (mL): 230 mL    Indwelling Catheter - Urethral (mL): 5100 mL  Total OUT: 5330 mL    Total NET: -5330 mL          Labs:                        9.4    12.21 )-----------( 241      ( 20 Jan 2023 02:55 )             29.5     01-20    141  |  103  |  28.9<H>  ----------------------------<  106<H>  3.6   |  25.0  |  3.44<H>    Ca    8.5      20 Jan 2023 02:55  Mg     2.2     01-19    TPro  6.8  /  Alb  2.9<L>  /  TBili  0.3<L>  /  DBili  x   /  AST  13  /  ALT  6   /  AlkPhos  126<H>  01-20          Culture - Fungal, Body Fluid (collected 19 Jan 2023 06:15)  Source: Pleural Fl Pleural Fluid  Preliminary Report (20 Jan 2023 08:11):    Testing in progress    Culture - Body Fluid with Gram Stain (collected 19 Jan 2023 06:15)  Source: Pleural Fl Pleural Fluid  Gram Stain (19 Jan 2023 20:10):    polymorphonuclear leukocytes seen    No organisms seen    by cytocentrifuge    Culture - Blood (collected 18 Jan 2023 20:20)  Source: .Blood Blood-Peripheral  Preliminary Report (20 Jan 2023 02:02):    No growth to date.    Culture - Blood (collected 18 Jan 2023 20:15)  Source: .Blood Blood-Peripheral  Preliminary Report (20 Jan 2023 02:02):    No growth to date.

## 2023-01-20 NOTE — PROGRESS NOTE ADULT - SUBJECTIVE AND OBJECTIVE BOX
[facility]                                                         CARDIOLOGY AT [facility]                                                                  [address], [place] [address]                                                         Telephone: [phone]. Fax:[phone]                                                                             PROGRESS NOTE    Reason for follow up: acute HF  Update: worsening GERSON, lasix DCed      Review of symptoms:   Cardiac:  No chest pain. No dyspnea. No palpitations.  Respiratory: no cough. No dyspnea  Gastrointestinal: No diarrhea. No abdominal pain. No bleeding.   Neuro: No focal neuro complaints.    Vitals:  T(C): 36.9 (01-20-23 @ 07:48), Max: 37.2 (01-19-23 @ 23:45)  HR: 74 (01-20-23 @ 07:48) (74 - 82)  BP: 144/74 (01-20-23 @ 07:48) (126/67 - 146/77)  RR: 18 (01-20-23 @ 07:48) (18 - 20)  SpO2: 100% (01-20-23 @ 07:48) (98% - 100%)  Wt(kg): --  I&O's Summary    19 Jan 2023 07:01  -  20 Jan 2023 07:00  --------------------------------------------------------  IN: 0 mL / OUT: 5330 mL / NET: -5330 mL          PHYSICAL EXAM:  Appearance: Comfortable. No acute distress  HEENT:  Atraumatic. Normocephalic.  Normal oral mucosa  Neurologic: A & O x 3, no gross focal deficits.  Cardiovascular: RRR S1 S2, +murmur, no rubs/gallops. No JVD  Respiratory: Lungs clear to auscultation, unlabored   Gastrointestinal:  Soft, Non-tender, + BS  Lower Extremities: 2+ Peripheral Pulses, No clubbing, cyanosis, LLE edema  Psychiatry: Patient is calm. No agitation.   Skin: warm and dry.    CURRENT CARDIAC MEDICATIONS:      CURRENT OTHER MEDICATIONS:  azithromycin  IVPB 500 milliGRAM(s) IV Intermittent every 24 hours  cefTRIAXone Injectable. 1000 milliGRAM(s) IV Push every 24 hours  acetaminophen     Tablet .. 650 milliGRAM(s) Oral every 6 hours PRN Temp greater or equal to 38C (100.4F), Mild Pain (1 - 3)  melatonin 3 milliGRAM(s) Oral at bedtime PRN Insomnia  ondansetron Injectable 4 milliGRAM(s) IV Push every 8 hours PRN Nausea and/or Vomiting  heparin   Injectable 5000 Unit(s) SubCutaneous every 12 hours  influenza  Vaccine (HIGH DOSE) 0.7 milliLiter(s) IntraMuscular once  tamsulosin 0.4 milliGRAM(s) Oral at bedtime      LABS:	 	  ( 20 Jan 2023 06:21 )  Troponin T  0.05 ,  CPK  X    , CKMB  X    , BNP X        , ( 19 Jan 2023 16:55 )  Troponin T  0.08<H>,  CPK  X    , CKMB  X    , BNP X        , ( 19 Jan 2023 03:59 )  Troponin T  0.12<H>,  CPK  143  , CKMB  X    , BNP X                                  9.4    12.21 )-----------( 241      ( 20 Jan 2023 02:55 )             29.5     01-20    141  |  103  |  28.9<H>  ----------------------------<  106<H>  3.6   |  25.0  |  3.44<H>    Ca    8.5      20 Jan 2023 02:55  Mg     2.2     01-19    TPro  6.8  /  Alb  2.9<L>  /  TBili  0.3<L>  /  DBili  x   /  AST  13  /  ALT  6   /  AlkPhos  126<H>  01-20      Lipid Profile: Date: 01-19 @ 03:59  Total cholesterol 140; Direct LDL: --; HDL: 42; Triglycerides:81    HgA1c:   TSH: Thyroid Stimulating Hormone, Serum: 1.22 uIU/mL      TELEMETRY: , [facility]      DIAGNOSTIC TESTING:  [ ] Echocardiogram:   < from: TTE Echo Complete w/ Contrast w/ Doppler (01.19.23 @ 10:16) >  PHYSICIAN INTERPRETATION:  Left Ventricle: Endocardial visualization was enhanced with intravenous   echo contrast. The left ventricular internal cavity size is normal. Left   ventricular wall thickness is normal.  Global LV systolic function was mildly decreased. Left ventricular   ejection fraction, by visual estimation, is 45 to 50%. Spectral Doppler   shows pseudonormal pattern of left ventricular myocardial filling (Grade   II diastolic dysfunction). Elevated mean left atrial pressure.  Right Ventricle: Normal right ventricular size and function. The right   ventricular size is normal. RV systolic function is normal.  Left Atrium: Moderately enlarged left atrium.  Right Atrium: Normal right atrial size.  Pericardium: Trivial pericardial effusion is present. The pericardial   effusion is localized near the left ventricle. There is a small pleural   effusion in both left and right lateral regions.  Mitral Valve: Mild thickening of the anterior and posterior mitral valve   leaflets. There is mild mitral annular calcification. Severe mitral valve   regurgitation is seen.  Tricuspid Valve: The tricuspid valve is normal in structure. Mild   tricuspid regurgitation is visualized. Estimated pulmonary artery   systolic pressure is 45.5 mmHg assuming a right atrial pressure of 8   mmHg, which is consistent with mild pulmonary hypertension.  Aortic Valve: The aortic valve is trileaflet. Sclerotic aortic valve with   normal opening. Trivial aortic valve regurgitation is seen.  Pulmonic Valve: The pulmonic valve is normal. Trace pulmonic valve   regurgitation.  Aorta: The aortic root is normal in size and structure. The ascending   aorta was not well visualized.  Pulmonary Artery: The pulmonary artery is of normal size and origin.  Venous: The inferior vena cava was normal sized, with respiratory size   variation less than 50%.    < end of copied text >

## 2023-01-20 NOTE — PROGRESS NOTE ADULT - SUBJECTIVE AND OBJECTIVE BOX
[facility]  INFECTIOUS DISEASES at [place] and [place]  =======================================================                               Bobby Guerrero MD#   Shaggy Ny MD*                             Renata Jovel MD*   Lashell Sandoval MD*            Diplomates American Board of Internal Medicine & Infectious Diseases                # [place] Office - Appt - Tel  [phone] Fax [phone]                * [place] Office - Appt - Tel [phone] Fax [phone]                                  Hospital Consult line:  [phone]  =======================================================    RAMON HOLDEN [id number]    Follow up: Leukocytosis      Allergies:  No Known Allergies       REVIEW OF SYSTEMS:  CONSTITUTIONAL:  No Fever or chills  HEENT:   No diplopia or blurred vision.  No earache, sore throat or runny nose.  CARDIOVASCULAR:  No Chest Pain  RESPIRATORY:  No cough, shortness of breath  GASTROINTESTINAL:  No nausea, vomiting or diarrhea.  GENITOURINARY:  No dysuria, frequency or urgency. No Blood in urine  MUSCULOSKELETAL:  no joint aches, no muscle pain  SKIN:  No change in skin, hair or nails.  NEUROLOGIC:  No Headaches, seizures   PSYCHIATRIC:  No disorder of thought or mood.  ENDOCRINE:  No heat or cold intolerance  HEMATOLOGICAL:  No easy bruising or bleeding.       Physical Exam:  GEN: NAD  HEENT: normocephalic and atraumatic. EOMI. PERRL.    NECK: Supple.   LUNGS: CTA B/L.  HEART: RRR  ABDOMEN: Soft, NT, ND.  +BS.    : No CVA tenderness  EXTREMITIES: Without  edema.  MSK: No joint swelling  NEUROLOGIC: No Focal Deficits   PSYCHIATRIC: Appropriate affect .  SKIN: No rash      Vitals:  T(F): 99 (20 Jan 2023 11:39), Max: 99 (19 Jan 2023 23:45)  HR: 79 (20 Jan 2023 11:39)  BP: 128/64 (20 Jan 2023 11:39)  RR: 18 (20 Jan 2023 11:39)  SpO2: 98% (20 Jan 2023 11:39) (98% - 100%)  temp max in last 48H T(F): , Max: 99 (01-19-23 @ 23:45)      Current Antibiotics:  azithromycin  IVPB 500 milliGRAM(s) IV Intermittent every 24 hours  cefTRIAXone Injectable. 1000 milliGRAM(s) IV Push every 24 hours    Other medications:  heparin   Injectable 5000 Unit(s) SubCutaneous every 12 hours  influenza  Vaccine (HIGH DOSE) 0.7 milliLiter(s) IntraMuscular once  lidocaine 2% Jelly 5 milliLiter(s) IntraUrethral once  tamsulosin 0.4 milliGRAM(s) Oral at bedtime                            9.4    12.21 )-----------( 241      ( 20 Jan 2023 02:55 )             29.5     01-20    141  |  103  |  28.9<H>  ----------------------------<  106<H>  3.6   |  25.0  |  3.44<H>    Ca    8.5      20 Jan 2023 02:55  Mg     2.2     01-19    TPro  6.8  /  Alb  2.9<L>  /  TBili  0.3<L>  /  DBili  x   /  AST  13  /  ALT  6   /  AlkPhos  126<H>  01-20    RECENT CULTURES:  01-19 @ 06:15 Pleural Fl Pleural Fluid     No growth  polymorphonuclear leukocytes seen  No organisms seen  by cytocentrifuge    01-18 @ 20:30    [facility]    01-18 @ 20:20 .Blood Blood-Peripheral     No growth to date.    01-18 @ 20:15 .Blood Blood-Peripheral     No growth to date.      WBC Count: 12.21 K/uL (01-20-23 @ 02:55)  WBC Count: 12.63 K/uL (01-19-23 @ 03:59)  WBC Count: 16.00 K/uL (01-18-23 @ 20:20)    Creatinine, Serum: 3.44 mg/dL (01-20-23 @ 02:55)  Creatinine, Serum: 3.09 mg/dL (01-19-23 @ 03:59)  Creatinine, Serum: 3.05 mg/dL (01-18-23 @ 20:20)    Procalcitonin, Serum: 0.09 ng/mL (01-18-23 @ 20:20)     SARS-CoV-2: NotDetec (01-18-23 @ 20:30)        < from: TTE Echo Complete w/ Contrast w/ Doppler (01.19.23 @ 10:16) >  Summary:   1. Endocardial visualization was enhanced with intravenous echo contrast.   2. Moderately enlarged left atrium.   3. Left ventricular ejection fraction, by visual estimation, is 45 to   50%.   4. Grade II diastolic dysfunction. Elevated mean left atrial pressure.   5. Normal right atrial size.   6. Normal right ventricular size and function.   7. Severe mitral valve regurgitation. tethering and tenting of the   leaflets suggestive of functional or ischemic MR.   8. Mild tricuspid regurgitation.   9. Estimated pulmonary artery systolic pressure is 46 mmHg assuming   -mild pulmonary hypertension.  10. Trivial pericardial effusion.    < end of copied text >

## 2023-01-20 NOTE — PROGRESS NOTE ADULT - ASSESSMENT
77y  Male with h/o BPH, presented to ED on 1/18 c/o shortness of breath.  Found to be in acute hypoxic respiratory failure.  Patient states shortness of breath began acutely 1 day prior to presentation and noticed LE edema recently.  No previous diagnosis of CHF in the past.  States he was told he may have kidney disease but does not know baseline creatinine.  Given lasix for diuresis.  Attempted goyal by ED and urology but unable to place, now s/p Goyal placement by urology via cystoscopy. On 6L NC.  Has been afebrile. Initially with leukocytosis to 16k. CT Chest reporting B/L pleural effusions. Started on Azithromycin and Ceftriaxone.       Acute hypoxic respiratory failure  Leukocytosis  GERSON  Pleural effusion   s/p chest tube      - Blood cultures 1/18 no growth   - RVP/COVID 19 PCR 1/18 negative   - CT Chest with pleural effusions, L>R   - UA negative for UTI   - Procalcitonin level 0.09  - pleural fluid studies consistent with transudative effusion   - TTE reduced EF and diastolic dysfunction   - Check Urine for Legionella   - D/C Azithromycin  - D/C Ceftriaxone   - Follow up cultures  - Trend Fever  - Trend WBC      Will sign off. Please call PRN.

## 2023-01-20 NOTE — PROGRESS NOTE ADULT - SUBJECTIVE AND OBJECTIVE BOX
[facility] Division of Hospital Medicine    Chief Complaint: Acute hypoxic respiratory failure 2/2 Bacterial PNA and new onset CHF     SUBJECTIVE / OVERNIGHT EVENTS: No acute events overnight. HD stable. Patient continues to require NC 2L.     Patient denies chest pain, SOB, abd pain, N/V, fever, chills, dysuria or any other complaints. All remainder ROS negative.     MEDICATIONS  (STANDING):  azithromycin  IVPB 500 milliGRAM(s) IV Intermittent every 24 hours  cefTRIAXone Injectable. 1000 milliGRAM(s) IV Push every 24 hours  heparin   Injectable 5000 Unit(s) SubCutaneous every 12 hours  influenza  Vaccine (HIGH DOSE) 0.7 milliLiter(s) IntraMuscular once  lidocaine 2% Jelly 5 milliLiter(s) IntraUrethral once  tamsulosin 0.4 milliGRAM(s) Oral at bedtime    MEDICATIONS  (PRN):  acetaminophen     Tablet .. 650 milliGRAM(s) Oral every 6 hours PRN Temp greater or equal to 38C (100.4F), Mild Pain (1 - 3)  melatonin 3 milliGRAM(s) Oral at bedtime PRN Insomnia  ondansetron Injectable 4 milliGRAM(s) IV Push every 8 hours PRN Nausea and/or Vomiting        I&O's Summary    2023 07:01  -  2023 07:00  --------------------------------------------------------  IN: 0 mL / OUT: 5330 mL / NET: -5330 mL        PHYSICAL EXAM:  Vital Signs Last 24 Hrs  T(C): 37.2 (2023 11:39), Max: 37.2 (2023 23:45)  T(F): 99 (2023 11:39), Max: 99 (2023 23:45)  HR: 79 (2023 11:39) (74 - 82)  BP: 128/64 (2023 11:39) (126/67 - 146/77)  BP(mean): --  RR: 18 (2023 11:39) (18 - 20)  SpO2: 98% (2023 11:39) (98% - 100%)    Parameters below as of 2023 11:39  Patient On (Oxygen Delivery Method): nasal cannula  O2 Flow (L/min): 2    General: NAD; frail appearing  Cardiovascular: +S1, S2; Regular rate and rhythm, no murmurs, rubs, gallops; 3+ pitting edema in BL LE  Respiratory: Diffuse wheeze; increased work of breathing  Gastrointestinal: Abdomen soft, non-tender, +BS in all 4 quadrants  Extremities: No clubbing, cyanosis, 3+ pitting edema in BL LE to the level of the knee  Neuro: Non-focal, AAOx4, sensation intact b/l    LABS:                        9.4    12.21 )-----------( 241      ( 2023 02:55 )             29.5         141  |  103  |  28.9<H>  ----------------------------<  106<H>  3.6   |  25.0  |  3.44<H>    Ca    8.5      2023 02:55  Mg     2.2         TPro  6.8  /  Alb  2.9<L>  /  TBili  0.3<L>  /  DBili  x   /  AST  13  /  ALT  6   /  AlkPhos  126<H>        CARDIAC MARKERS ( 2023 06:21 )  x     / 0.05 ng/mL / x     / x     / x      CARDIAC MARKERS ( 2023 16:55 )  x     / 0.08 ng/mL / x     / x     / x      CARDIAC MARKERS ( 2023 03:59 )  x     / 0.12 ng/mL / 143 U/L / x     / x      CARDIAC MARKERS ( 2023 01:07 )  x     / 0.10 ng/mL / 166 U/L / x     / 6.0 ng/mL  CARDIAC MARKERS ( 2023 20:20 )  x     / 0.02 ng/mL / x     / x     / x          Urinalysis Basic - ( 2023 09:30 )    Color: Yellow / Appearance: Clear / S.010 / pH: x  Gluc: x / Ketone: Negative  / Bili: Negative / Urobili: Negative mg/dL   Blood: x / Protein: 15 mg/dL / Nitrite: Negative   Leuk Esterase: Negative / RBC: 11-25 /HPF / WBC 0-2 /HPF   Sq Epi: x / Non Sq Epi: Occasional / Bacteria: Occasional        Culture - Fungal, Body Fluid (collected 2023 06:15)  Source: Pleural Fl Pleural Fluid  Preliminary Report (2023 08:11):    Testing in progress    Culture - Body Fluid with Gram Stain (collected 2023 06:15)  Source: Pleural Fl Pleural Fluid  Gram Stain (2023 20:10):    polymorphonuclear leukocytes seen    No organisms seen    by cytocentrifuge  Preliminary Report (2023 11:48):    No growth    Culture - Blood (collected 2023 20:20)  Source: .Blood Blood-Peripheral  Preliminary Report (2023 02:02):    No growth to date.    Culture - Blood (collected 2023 20:15)  Source: .Blood Blood-Peripheral  Preliminary Report (2023 02:02):    No growth to date.      CAPILLARY BLOOD GLUCOSE            RADIOLOGY & ADDITIONAL TESTS:  Results Reviewed:   Imaging Personally Reviewed:  Electrocardiogram Personally Reviewed:

## 2023-01-20 NOTE — PROGRESS NOTE ADULT - ASSESSMENT
78 yo male with pmhx reportedly only of BPH presenting to the ED in acute hypoxic respiratory failure on NRB by EMS.     Acute Hypoxic Respiratory Failure;  Multifactorial 2/2 Acute Bacterial PNA and likely Acute onset of CHF  -Admit to medicine with telemetry monitoring  -CXR with R sided infiltrate/effusion; CT chest with moderate pleural effusions BL, pulmonary edema, and cardiomegaly  -Echo showed 45-50%, DDII, severe MR  -Monitor electrolytes, replete to maintain K > 4.0, Mg > 2.0  -Strict Is&Os, Daily weights  -Appreciate cardiology recs- will hold lasix 2/2 GERSON   - Will need ischemic evaluation pending improvement in renal function  - Nephro recs appreciated    -SIRS criteria met: WBC 16K, , RR 30  -Ceftriaxone and Azithromycin for gram negative and atypical coverage for CAP  -Follow up blood cultures, sputum culture, legionella and strep  -Chest PT, incentive spirometry   - Appreciate ID recs- discontinued CTX/AZT  - Follow up pleural fluid studies  - Appreciate thoracic recs- Will c/w RT pigtail to waterseal.     GERSON, unknown baseline; BL hydronephrosis, Obstructive uropathy  -Cr 3.05, no baseline for comparison, but patient does state he has been told he has impaired kidney function in the past; though, he has never been referred to nephrology  -CT ab/pel showing BL hydronephrosis; likely obstruction 2/2 BPH  -Place Goyal catheter to relieve obstruction  -Check Urine studies including UA, Uosm, Arron, UCr  -Check Renal ultrasound  -Possibly cardiorenal component in the setting of acute CHF  -Monitor BMP  - Appreciate urology recs- will c/w goyal  - US notable for moderate b/l hydronephrosis    BPH  -Continue Flomax  -Goyal placed for obstruction    DVTppx: Heparin  GOC: Full Code  Dispo pending clinical improvement. Not on home oxygen

## 2023-01-20 NOTE — PROGRESS NOTE ADULT - ASSESSMENT
76 YO M w/ BPH presents with SOB. Admitted for acute hypoxic respiratory failure in setting of PNA complicated by b/l effusions, partially loculated on the left + decompensated heart failure. Also found to have b/l hydronephrosis. Hospital course is notable for R chest tube placement on 01/19 and Goyal placement on 01/19. TTE showed severe mitral valve regurg + mild pulmonary HTN + diastolic dysfunction. Nephrology is consulted for GERSON on ? CKD.      1. 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 Metabolic acidosis in setting of CKD - serum bicarb is WNL  4. Anemia - workup for iron def  5. 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 B/l hydronephrosis - s/p goyal - management of goyal as per Urology

## 2023-01-20 NOTE — PROGRESS NOTE ADULT - ASSESSMENT
76 y/o male with hx of BPH, ?CKD, current smoker who presents with SOB and leg swelling. Pt states that he noticed his feet and ankles swelling up 4 days ago and he started having trouble breathing yesterday. In the ED pt was found very hypoxemic with SpO2 74% on room air. Labs show creatinine 3.05, D-dimer 789, troponin negative and proBNP 6457. EKG shows  bpm with no acute ST/T changes.

## 2023-01-20 NOTE — SBIRT NOTE ADULT - NSSBIRTDRGPOSREINDET_GEN_A_CORE
Pt reports he is a non drinker and does not use drugs. Pt smokes cigarettes, 1-2 cigarettes daily. Pt states he feels comfortable with his smoking and would not like resources to quit.

## 2023-01-20 NOTE — PROGRESS NOTE ADULT - SUBJECTIVE AND OBJECTIVE BOX
Reason for visit:  GERSON    Subjective: No acute overnight event. Great UOP >5L/24hrs. No fever/chills.     ROS: All systems were reviewed in detail pertinent positive and negative mentioned above, rest are negative.    Physical Exam:  General: Thin elderly male in NAD  Cardiac: S1S2 RRR  Respiratory: CTAB  Abdomen: Soft, NT  Extremities: No appreciable edema  Neuro: AAOx3  goyal, chest tube    =======================================================  Vital Signs Last 24 Hrs  T(C): 37.2 (2023 11:39), Max: 37.2 (2023 23:45)  T(F): 99 (2023 11:39), Max: 99 (2023 23:45)  HR: 79 (2023 11:39) (74 - 82)  BP: 128/64 (2023 11:39) (126/67 - 146/77)  BP(mean): --  RR: 18 (2023 11:39) (18 - 20)  SpO2: 98% (2023 11:39) (98% - 100%)    Parameters below as of 2023 11:39  Patient On (Oxygen Delivery Method): nasal cannula  O2 Flow (L/min): 2    I&O's Summary    2023 07:01  -  2023 07:00  --------------------------------------------------------  IN: 0 mL / OUT: 5330 mL / NET: -5330 mL      =======================================================  Current Antibiotics:  azithromycin  IVPB 500 milliGRAM(s) IV Intermittent every 24 hours  cefTRIAXone Injectable. 1000 milliGRAM(s) IV Push every 24 hours    Other medications:  heparin   Injectable 5000 Unit(s) SubCutaneous every 12 hours  influenza  Vaccine (HIGH DOSE) 0.7 milliLiter(s) IntraMuscular once  lidocaine 2% Jelly 5 milliLiter(s) IntraUrethral once  tamsulosin 0.4 milliGRAM(s) Oral at bedtime    =======================================================      141  |  103  |  28.9<H>  ----------------------------<  106<H>  3.6   |  25.0  |  3.44<H>    Ca    8.5      2023 02:55  Mg     2.2         TPro  6.8  /  Alb  2.9<L>  /  TBili  0.3<L>  /  DBili  x   /  AST  13  /  ALT  6   /  AlkPhos  126<H>      Creatinine, Serum: 3.44 mg/dL (23 @ 02:55)  Creatinine, Serum: 3.09 mg/dL (23 @ 03:59)  Creatinine, Serum: 3.05 mg/dL (23 @ 20:20)    Urinalysis Basic - ( 2023 09:30 )    Color: Yellow / Appearance: Clear / S.010 / pH: x  Gluc: x / Ketone: Negative  / Bili: Negative / Urobili: Negative mg/dL   Blood: x / Protein: 15 mg/dL / Nitrite: Negative   Leuk Esterase: Negative / RBC: 11-25 /HPF / WBC 0-2 /HPF   Sq Epi: x / Non Sq Epi: Occasional / Bacteria: Occasional      =======================================================

## 2023-01-20 NOTE — PROGRESS NOTE ADULT - PROBLEM SELECTOR PLAN 1
-No prior records. Doesn't have a cardiologist -Was hypoxic on presentation.   -No prior records. Doesn't have a cardiologist  -Echo showed 45-50%, DDII, severe MR  -BNP 6457  -Net neg 5330 in 24 hours  -Lasix DCed 2/2worsening GERSON  -SOB improved

## 2023-01-21 LAB
ALBUMIN SERPL ELPH-MCNC: 2.6 G/DL — LOW (ref 3.3–5.2)
ALP SERPL-CCNC: 130 U/L — HIGH (ref 40–120)
ALT FLD-CCNC: 8 U/L — SIGNIFICANT CHANGE UP
ANION GAP SERPL CALC-SCNC: 14 MMOL/L — SIGNIFICANT CHANGE UP (ref 5–17)
APPEARANCE UR: CLEAR — SIGNIFICANT CHANGE UP
AST SERPL-CCNC: 16 U/L — SIGNIFICANT CHANGE UP
BACTERIA # UR AUTO: ABNORMAL
BILIRUB SERPL-MCNC: 0.3 MG/DL — LOW (ref 0.4–2)
BILIRUB UR-MCNC: NEGATIVE — SIGNIFICANT CHANGE UP
BUN SERPL-MCNC: 28.1 MG/DL — HIGH (ref 8–20)
CALCIUM SERPL-MCNC: 8.4 MG/DL — SIGNIFICANT CHANGE UP (ref 8.4–10.5)
CHLORIDE SERPL-SCNC: 102 MMOL/L — SIGNIFICANT CHANGE UP (ref 96–108)
CO2 SERPL-SCNC: 25 MMOL/L — SIGNIFICANT CHANGE UP (ref 22–29)
COLOR SPEC: YELLOW — SIGNIFICANT CHANGE UP
CREAT SERPL-MCNC: 3.39 MG/DL — HIGH (ref 0.5–1.3)
DIFF PNL FLD: ABNORMAL
EGFR: 18 ML/MIN/1.73M2 — LOW
EPI CELLS # UR: SIGNIFICANT CHANGE UP
FERRITIN SERPL-MCNC: 509 NG/ML — HIGH (ref 30–400)
GLUCOSE SERPL-MCNC: 95 MG/DL — SIGNIFICANT CHANGE UP (ref 70–99)
GLUCOSE UR QL: NEGATIVE MG/DL — SIGNIFICANT CHANGE UP
HCT VFR BLD CALC: 31.7 % — LOW (ref 39–50)
HGB BLD-MCNC: 10.2 G/DL — LOW (ref 13–17)
IRON SATN MFR SERPL: 11 % — LOW (ref 16–55)
IRON SATN MFR SERPL: 24 UG/DL — LOW (ref 59–158)
KETONES UR-MCNC: NEGATIVE — SIGNIFICANT CHANGE UP
LEGIONELLA AG UR QL: NEGATIVE — SIGNIFICANT CHANGE UP
LEUKOCYTE ESTERASE UR-ACNC: ABNORMAL
MCHC RBC-ENTMCNC: 27.2 PG — SIGNIFICANT CHANGE UP (ref 27–34)
MCHC RBC-ENTMCNC: 32.2 GM/DL — SIGNIFICANT CHANGE UP (ref 32–36)
MCV RBC AUTO: 84.5 FL — SIGNIFICANT CHANGE UP (ref 80–100)
NITRITE UR-MCNC: NEGATIVE — SIGNIFICANT CHANGE UP
PH UR: 7 — SIGNIFICANT CHANGE UP (ref 5–8)
PLATELET # BLD AUTO: 240 K/UL — SIGNIFICANT CHANGE UP (ref 150–400)
POTASSIUM SERPL-MCNC: 3.7 MMOL/L — SIGNIFICANT CHANGE UP (ref 3.5–5.3)
POTASSIUM SERPL-SCNC: 3.7 MMOL/L — SIGNIFICANT CHANGE UP (ref 3.5–5.3)
PROT SERPL-MCNC: 7.3 G/DL — SIGNIFICANT CHANGE UP (ref 6.6–8.7)
PROT UR-MCNC: 15
RBC # BLD: 3.75 M/UL — LOW (ref 4.2–5.8)
RBC # FLD: 16.2 % — HIGH (ref 10.3–14.5)
RBC CASTS # UR COMP ASSIST: ABNORMAL /HPF (ref 0–4)
SODIUM SERPL-SCNC: 141 MMOL/L — SIGNIFICANT CHANGE UP (ref 135–145)
SP GR SPEC: 1 — LOW (ref 1.01–1.02)
TIBC SERPL-MCNC: 219 UG/DL — LOW (ref 220–430)
TRANSFERRIN SERPL-MCNC: 153 MG/DL — LOW (ref 180–329)
UROBILINOGEN FLD QL: NEGATIVE MG/DL — SIGNIFICANT CHANGE UP
WBC # BLD: 10.41 K/UL — SIGNIFICANT CHANGE UP (ref 3.8–10.5)
WBC # FLD AUTO: 10.41 K/UL — SIGNIFICANT CHANGE UP (ref 3.8–10.5)
WBC UR QL: SIGNIFICANT CHANGE UP /HPF (ref 0–5)

## 2023-01-21 PROCEDURE — 99233 SBSQ HOSP IP/OBS HIGH 50: CPT

## 2023-01-21 PROCEDURE — 99232 SBSQ HOSP IP/OBS MODERATE 35: CPT

## 2023-01-21 PROCEDURE — 71045 X-RAY EXAM CHEST 1 VIEW: CPT | Mod: 26

## 2023-01-21 PROCEDURE — 71045 X-RAY EXAM CHEST 1 VIEW: CPT | Mod: 26,77

## 2023-01-21 PROCEDURE — 99231 SBSQ HOSP IP/OBS SF/LOW 25: CPT

## 2023-01-21 RX ORDER — IRON SUCROSE 20 MG/ML
200 INJECTION, SOLUTION INTRAVENOUS EVERY 24 HOURS
Refills: 0 | Status: COMPLETED | OUTPATIENT
Start: 2023-01-21 | End: 2023-01-24

## 2023-01-21 RX ADMIN — IRON SUCROSE 110 MILLIGRAM(S): 20 INJECTION, SOLUTION INTRAVENOUS at 12:22

## 2023-01-21 RX ADMIN — HEPARIN SODIUM 5000 UNIT(S): 5000 INJECTION INTRAVENOUS; SUBCUTANEOUS at 17:28

## 2023-01-21 RX ADMIN — ERGOCALCIFEROL 50000 UNIT(S): 1.25 CAPSULE ORAL at 12:22

## 2023-01-21 RX ADMIN — HEPARIN SODIUM 5000 UNIT(S): 5000 INJECTION INTRAVENOUS; SUBCUTANEOUS at 05:21

## 2023-01-21 NOTE — PROGRESS NOTE ADULT - SUBJECTIVE AND OBJECTIVE BOX
Brief summary:  77yMale seen and assessed at bedside.  Pt laying comfortably in hospital bed in NAD on room air.  States no physical complaints at this time.  Denies f/c, n/v, chest pain, sob, abdominal pain, d/c, urinary symptoms.  ROS negative x 10 except as indicated in HPI.    Overnight events:  None.  Hospital course progressing as expected.        PAST MEDICAL & SURGICAL HISTORY:  BPH (benign prostatic hyperplasia)    History of appendectomy        Medications:  acetaminophen     Tablet .. 650 milliGRAM(s) Oral every 6 hours PRN  ergocalciferol 90101 Unit(s) Oral every week  heparin   Injectable 5000 Unit(s) SubCutaneous every 12 hours  influenza  Vaccine (HIGH DOSE) 0.7 milliLiter(s) IntraMuscular once  iron sucrose IVPB 200 milliGRAM(s) IV Intermittent every 24 hours  lidocaine 2% Jelly 5 milliLiter(s) IntraUrethral once  melatonin 3 milliGRAM(s) Oral at bedtime PRN  ondansetron Injectable 4 milliGRAM(s) IV Push every 8 hours PRN  tamsulosin 0.4 milliGRAM(s) Oral at bedtime      MEDICATIONS  (PRN):  acetaminophen     Tablet .. 650 milliGRAM(s) Oral every 6 hours PRN Temp greater or equal to 38C (100.4F), Mild Pain (1 - 3)  melatonin 3 milliGRAM(s) Oral at bedtime PRN Insomnia  ondansetron Injectable 4 milliGRAM(s) IV Push every 8 hours PRN Nausea and/or Vomiting        Daily Review:               10.2   10.41 )-----------( 240      ( 21 Jan 2023 05:00 )             31.7   01-21    141  |  102  |  28.1<H>  ----------------------------<  95  3.7   |  25.0  |  3.39<H>    Ca    8.4      21 Jan 2023 05:00    TPro  7.3  /  Alb  2.6<L>  /  TBili  0.3<L>  /  DBili  x   /  AST  16  /  ALT  8   /  AlkPhos  130<H>  01-21    CARDIAC MARKERS ( 20 Jan 2023 06:21 )  x     / 0.05 ng/mL / x     / x     / x      CARDIAC MARKERS ( 19 Jan 2023 16:55 )  x     / 0.08 ng/mL / x     / x     / x            T(C): 36.5 (01-21-23 @ 11:00), Max: 37.1 (01-20-23 @ 22:07)  HR: 77 (01-21-23 @ 11:00) (77 - 88)  BP: 135/67 (01-21-23 @ 11:00) (125/64 - 135/67)  RR: 18 (01-21-23 @ 04:32) (16 - 18)  SpO2: 96% (01-21-23 @ 11:00) (96% - 100%)      I&O's Summary    20 Jan 2023 07:01  -  21 Jan 2023 07:00  --------------------------------------------------------  IN: 0 mL / OUT: 1620 mL / NET: -1620 mL        Physical Exam    Neuro: A+O x 3, non-focal, speech clear and intact  Pulm: coarse breath sounds bilaterally, no wheezing or rales  CV: RRR, +S1S2  Abd: soft, NT, ND, normoactive bowel sounds  Ext: +DP Pulses b/l, +PT pulses, no edema  Chest tubes: right chest tube to water seal, no air leak        Chest XR 1/20/23    < from: Xray Chest 1 View- PORTABLE-Urgent (Xray Chest 1 View- PORTABLE-Urgent .) (01.20.23 @ 06:54) >    PROCEDURE DATE:  01/20/2023          INTERPRETATION:  Portable chest radiograph    CLINICAL INFORMATION: Pneumothorax. Follow-up    TECHNIQUE:  Portable  AP chest radiograph.    COMPARISON: CT chest 1/19/2023 and chest x-ray 1/19/2023 .    FINDINGS:  CATHETERS AND TUBES: RIGHT multi-sidehole pigtail catheter overlies RIGHT   lower hemithorax.    PULMONARY: Residual mild RIGHT effusion and/or RIGHT lower zone diffuse   airspace disease.  Decreased LEFT effusion and LEFT retrocardiac airspace consolidation..  LEFT upper zone calcified pleural plaque disease/pleural thickening as   seen on prior CT scan 1/19/2023/.  RIGHT upper zone calcified pleural plaque disease.  .   No pneumothorax.    HEART/VASCULAR: The heart and mediastinum size and configuration are   within normal limits.    BONES: Visualized osseous structures are intact.    IMPRESSION: No pneumothorax.    Decreased LEFT effusion with residual LEFT retrocardiac airspace   consolidation/atelectasis.  Residual RIGHT lower zone small effusion and/or RIGHT lower lobe   reexpansion pneumonitis.  RIGHT multi-sidehole pigtail catheter overlies RIGHT lower hemithorax..  .    --- End of Report ---    < end of copied text >

## 2023-01-21 NOTE — PROGRESS NOTE ADULT - PROBLEM SELECTOR PLAN 1
POCUS with fluid pocket in right chest  Right pigtail placed 1/19  transudative by lights criteria  Will f/u pleural culture and cytology   S/p removal of PTC.  If post removal CXR stable, will sign off  Rest of care per primary team    Plan discussed with Dr. Zapien POCUS with fluid pocket in right chest  Right pigtail placed 1/19  transudative by lights criteria  Will f/u pleural culture and cytology   S/p removal of PTC.  Post removal CXR stable.  Thoracic surgery will sign off.  May reconsult prn.  Rest of care per primary team    Plan discussed with Dr. Zapien

## 2023-01-21 NOTE — PROGRESS NOTE ADULT - ASSESSMENT
76 YO M w/ BPH presents with SOB. Admitted for acute hypoxic respiratory failure in setting of PNA complicated by b/l effusions, partially loculated on the left + decompensated heart failure. Also found to have b/l hydronephrosis. Hospital course is notable for R chest tube placement on 01/19 and Goyal placement on 01/19. TTE showed severe mitral valve regurg + mild pulmonary HTN + diastolic dysfunction. Nephrology is consulted for GERSON on ? CKD.      1. 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 Metabolic acidosis in setting of CKD - serum bicarb is WNL  4. Anemia - workup for iron def  5. 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 B/l hydronephrosis - s/p goyal - management of goyal as per Urology        78 YO M w/ BPH presents with SOB. Admitted for acute hypoxic respiratory failure in setting of PNA complicated by b/l effusions, partially loculated on the left + decompensated heart failure. Also found to have b/l hydronephrosis. Hospital course is notable for R chest tube placement on 01/19 and Goyal placement on 01/19. TTE showed severe mitral valve regurg + mild pulmonary HTN + diastolic dysfunction. Nephrology is consulted for GERSON on ? CKD.      1. 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 D/w patient again and he mentioned lab work done around 3months ago w/ PCP and no mention of advanced CKD was made.   -SCr continues to be elevated here 3+, up to 3.39 mg/dl today  -UA 15 protein, UPCR 0.3  -CT showed b/l hydronephrosis, Follow up renal ultrasound showed R kidney 10.9cm + L kidney 9.9cm + b/l hydronephrosis   -s/p goyal placement on 01/19/23 - non oliguric   -GERSON form chr PIMENTEL/ VUR--> will wait n watch over the weekend w/ hope of SCr to start improving   -Will follow closely and discuss w/ patient further      2.Blood pressure acceptable   3. Metabolic acidosis in setting of CKD - serum bicarb is WNL  4. Anemia - workup for iron def  5. 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 B/l hydronephrosis - s/p goyal - management of goyal as per Urology

## 2023-01-21 NOTE — PROGRESS NOTE ADULT - ASSESSMENT
78 y/o M w/ PMH of BPH was BIBA for sob.  Pt was found to be hypoxic in the feild and placed on a NRB.        Acute Hypoxic Respiratory Failure multifactorial 2/2 pleural effusions 2/2 acute decompensated HFrEF   - PNA unlikley and all cxs NGTD; Abx d/c'd per ID recs   - No longer on supplemental O2   - CT removed today by CT surgery   - Fluid was transudative by lights criteria and no growth on culture   - CT chest reviewed and noted above   - Repeat CXR reviewed and noted above   - TTE reporting EF 45-50% and severe MR  - Maintain K > 4.0, Mg > 2.0  - Strict I&Os, Daily weights and fluid restrict   - Lasix on hold 2/2 GERSON   - Will need ischemic w/u once renal fxn improves   - Cardio and nephro following and recs noted       SIRS likely reactive 2/2 acute CHF exacerbation   - Leukocytosis resolving   - All cxs NGTD and procal 0.09  - Abx d/c'd per ID recs   - Pleural fluid transudative   - ID following and recs noted       GERSON on ?CKD / b/l hydro concerning for obstructive uropathy 2/2 BPH  - Baseline renal fxn unknown    - CT and Renal sono reporting mod b/l hydro   - s/p goyal placement 01/19  - Cr remains elevated at 3.39   - c/w flomax and monitor I/O's   - As per pt he was told he was told his kidney function is impaired by PMD   - Urine studies reviewed   - Avoid nephrotoxic meds or renally dose if needed  - Nephro following and recs noted       Normocytic anemia suspect of chronic dx   - H/h stable   - Hemodynamically stable and no active bleeding   - Iron panel reviewed   - Monitor CBC and transfuse if Hb<8      VTE ppx: heparin sq    Dispo: Remains acute requiring inpt hospitalization.

## 2023-01-21 NOTE — PROGRESS NOTE ADULT - SUBJECTIVE AND OBJECTIVE BOX
Chief Complaint:  resp failure    SUBJECTIVE / OVERNIGHT EVENTS:  no acute events reported overnight.  Pt states he had trouble sleeping due to pain at site of CT.  Pt denies sob, cp, palpitatioins, abd pain.          I&O's Summary    2023 07:01  -  2023 07:00  --------------------------------------------------------  IN: 0 mL / OUT: 1620 mL / NET: -1620 mL    2023 07:01  -  2023 18:54  --------------------------------------------------------  IN: 0 mL / OUT: 900 mL / NET: -900 mL          PHYSICAL EXAM:  Vital Signs Last 24 Hrs  T(C): 36.4 (2023 17:16), Max: 37.1 (2023 22:07)  T(F): 97.5 (2023 17:16), Max: 98.8 (2023 22:07)  HR: 73 (2023 17:16) (73 - 88)  BP: 130/71 (2023 17:16) (125/64 - 135/67)  BP(mean): --  RR: 18 (2023 17:16) (16 - 18)  SpO2: 92% (2023 17:16) (92% - 100%)    Parameters below as of 2023 17:16  Patient On (Oxygen Delivery Method): nasal cannula  O2 Flow (L/min): 2        GENERAL: pt examined bedside, laying comfortably in bed in NAD  HEENT: NC/AT, moist oral mucosa, clear conjunctiva, sclera nonicteric  RESPIRATORY: Normal respiratory effort, no wheezing, rhonchi, rales, +Rt CT   CARDIOVASCULAR: RRR, normal S1 and S2  ABDOMEN: soft, NT/ND, +BS, no rebound/guarding  EXTREMITIES: No cynaosis, no clubbing, no lower extremity edema, pulses are 2+ bilaterally  NEUROLOGY: A+O to person, place, and time, no focal neurologic deficits appreciated   SKIN: No rashes or no palpable lesions        LABS:                        10.2   10.41 )-----------( 240      ( 2023 05:00 )             31.7         141  |  102  |  28.1<H>  ----------------------------<  95  3.7   |  25.0  |  3.39<H>    Ca    8.4      2023 05:00    TPro  7.3  /  Alb  2.6<L>  /  TBili  0.3<L>  /  DBili  x   /  AST  16  /  ALT  8   /  AlkPhos  130<H>  -      CARDIAC MARKERS ( 2023 06:21 )  x     / 0.05 ng/mL / x     / x     / x          Urinalysis Basic - ( 2023 06:30 )    Color: Yellow / Appearance: Clear / S.005 / pH: x  Gluc: x / Ketone: Negative  / Bili: Negative / Urobili: Negative mg/dL   Blood: x / Protein: 15 / Nitrite: Negative   Leuk Esterase: Trace / RBC: 6-10 /HPF / WBC 0-2 /HPF   Sq Epi: x / Non Sq Epi: Occasional / Bacteria: Occasional        Culture - Urine (collected 2023 09:30)  Source: Clean Catch Clean Catch (Midstream)  Final Report (2023 16:57):    <10,000 CFU/mL Normal Urogenital Maryam    Culture - Fungal, Body Fluid (collected 2023 06:15)  Source: Pleural Fl Pleural Fluid  Preliminary Report (2023 15:03):    Culture is being performed. Fungal cultures are held for 4 weeks.    Culture - Body Fluid with Gram Stain (collected 2023 06:15)  Source: Pleural Fl Pleural Fluid  Gram Stain (2023 20:10):    polymorphonuclear leukocytes seen    No organisms seen    by cytocentrifuge  Preliminary Report (2023 11:48):    No growth    Culture - Blood (collected 2023 20:20)  Source: .Blood Blood-Peripheral  Preliminary Report (2023 02:02):    No growth to date.    Culture - Blood (collected 2023 20:15)  Source: .Blood Blood-Peripheral  Preliminary Report (2023 02:02):    No growth to date.      CAPILLARY BLOOD GLUCOSE            RADIOLOGY & ADDITIONAL TESTS:    < from: US Duplex Venous Lower Ext Ltd, Left (23 @ 10:24) >  IMPRESSION:  No evidence of left lower extremity deep venous thrombosis.    < end of copied text >      < from: US Renal (23 @ 10:23) >  IMPRESSION:  Moderate bilateral hydronephrosis.    Urinary bladder is collapsed around a Ulrich catheter.    < end of copied text >      < from: Xray Chest 1 View- PORTABLE-Urgent (Xray Chest 1 View- PORTABLE-Urgent .) (23 @ 06:54) >  IMPRESSION: No pneumothorax.    Decreased LEFT effusion with residual LEFT retrocardiac airspace   consolidation/atelectasis.  Residual RIGHT lower zone small effusion and/or RIGHT lower lobe   reexpansion pneumonitis.  RIGHT multi-sidehole pigtail catheter overlies RIGHT lower hemithorax..    < end of copied text >      < from: CT Chest No Cont (23 @ 00:27) >  IMPRESSION:  Moderate volume bilateral pleural effusions, partially loculated on the   left. Interlobular septal thickening representing interstitial pulmonary   edema/CHF.  Small volume perihepatic and perisplenic ascites.  Incompletely visualized mild symmetric bilateral hydronephrosis.    < end of copied text >      < from: TTE Echo Complete w/ Contrast w/ Doppler (23 @ 10:16) >  Summary:   1. Endocardial visualization was enhanced with intravenous echo contrast.   2. Moderately enlarged left atrium.   3. Left ventricular ejection fraction, by visual estimation, is 45 to   50%.   4. Grade II diastolic dysfunction. Elevated mean left atrial pressure.   5. Normal right atrial size.   6. Normal right ventricular size and function.   7. Severe mitral valve regurgitation. tethering and tenting of the   leaflets suggestive of functional or ischemic MR.   8. Mild tricuspid regurgitation.   9. Estimated pulmonary artery systolic pressure is 46 mmHg assuming   -mild pulmonary hypertension.  10. Trivial pericardial effusion.    < end of copied text >      MEDICATIONS  (STANDING):  ergocalciferol 36344 Unit(s) Oral every week  heparin   Injectable 5000 Unit(s) SubCutaneous every 12 hours  influenza  Vaccine (HIGH DOSE) 0.7 milliLiter(s) IntraMuscular once  iron sucrose IVPB 200 milliGRAM(s) IV Intermittent every 24 hours  lidocaine 2% Jelly 5 milliLiter(s) IntraUrethral once  tamsulosin 0.4 milliGRAM(s) Oral at bedtime    MEDICATIONS  (PRN):  acetaminophen     Tablet .. 650 milliGRAM(s) Oral every 6 hours PRN Temp greater or equal to 38C (100.4F), Mild Pain (1 - 3)  melatonin 3 milliGRAM(s) Oral at bedtime PRN Insomnia  ondansetron Injectable 4 milliGRAM(s) IV Push every 8 hours PRN Nausea and/or Vomiting

## 2023-01-21 NOTE — PROGRESS NOTE ADULT - ASSESSMENT
77yMale with h/o BPH, presented to ED on 1/18 c/o shortness of breath.  BIBEMS on NRB.  Found to be in acute hypoxic respiratory failure.  Patient states shortness of breath began acutely the previous day and noticed mild LE edema recently.  No previous diagnosis of CHF in the past.  States he was told he may have kidney disease but does not know baseline creatinine.  Given lasix for diuresis.  Pt with decreased urine output.  Attempted goyal by ED and urology but unable to place.  States currently feeling better on 6L NC.  Thoracic surgery consulted for bilateral pleural effusions.  S/p right pigtail catheter placed 1/19, with 400 cc output.  S/p PTC removal 1/21.

## 2023-01-21 NOTE — PROGRESS NOTE ADULT - SUBJECTIVE AND OBJECTIVE BOX
Reason for visit:  GERSON    Subjective: No acute overnight event. Great UOP >5L/24hrs. No fever/chills.     ROS: All systems were reviewed in detail pertinent positive and negative mentioned above, rest are negative.    Physical Exam:  General: Thin elderly male in NAD  Cardiac: S1S2 RRR  Respiratory: CTAB  Abdomen: Soft, NT  Extremities: No appreciable edema  Neuro: AAOx3  goyal, chest tube    =======================================================  Vital Signs Last 24 Hrs  T(C): 36.4 (2023 17:16), Max: 37.1 (2023 22:07)  T(F): 97.5 (2023 17:16), Max: 98.8 (2023 22:07)  HR: 73 (2023 17:16) (73 - 88)  BP: 130/71 (2023 17:16) (130/71 - 135/67)  BP(mean): --  RR: 18 (2023 17:16) (16 - 18)  SpO2: 92% (2023 17:16) (92% - 100%)    Parameters below as of 2023 17:16  Patient On (Oxygen Delivery Method): nasal cannula  O2 Flow (L/min): 2    I&O's Summary    2023 07:  -  2023 07:00  --------------------------------------------------------  IN: 0 mL / OUT: 1620 mL / NET: -1620 mL    2023 07:  -  2023 20:55  --------------------------------------------------------  IN: 0 mL / OUT: 900 mL / NET: -900 mL      =======================================================  Current Antibiotics:    Other medications:  ergocalciferol 62713 Unit(s) Oral every week  heparin   Injectable 5000 Unit(s) SubCutaneous every 12 hours  influenza  Vaccine (HIGH DOSE) 0.7 milliLiter(s) IntraMuscular once  iron sucrose IVPB 200 milliGRAM(s) IV Intermittent every 24 hours  lidocaine 2% Jelly 5 milliLiter(s) IntraUrethral once  tamsulosin 0.4 milliGRAM(s) Oral at bedtime    =======================================================      141  |  102  |  28.1<H>  ----------------------------<  95  3.7   |  25.0  |  3.39<H>    Ca    8.4      2023 05:00    TPro  7.3  /  Alb  2.6<L>  /  TBili  0.3<L>  /  DBili  x   /  AST  16  /  ALT  8   /  AlkPhos  130<H>      Creatinine, Serum: 3.39 mg/dL (23 @ 05:00)  Creatinine, Serum: 3.44 mg/dL (23 @ 02:55)  Creatinine, Serum: 3.09 mg/dL (23 @ 03:59)  Creatinine, Serum: 3.05 mg/dL (23 @ 20:20)    Urinalysis Basic - ( 2023 06:30 )    Color: Yellow / Appearance: Clear / S.005 / pH: x  Gluc: x / Ketone: Negative  / Bili: Negative / Urobili: Negative mg/dL   Blood: x / Protein: 15 / Nitrite: Negative   Leuk Esterase: Trace / RBC: 6-10 /HPF / WBC 0-2 /HPF   Sq Epi: x / Non Sq Epi: Occasional / Bacteria: Occasional      ======================================================= Reason for visit:  GERSON    Subjective: No acute overnight event. Good UOP. No fever/chills.     ROS: All systems were reviewed in detail pertinent positive and negative mentioned above, rest are negative.    Physical Exam:  General: Thin elderly male in NAD  Cardiac: S1S2 RRR  Respiratory: CTAB  Abdomen: Soft, NT  Extremities: No appreciable edema  Neuro: AAOx3  goyal, chest tube    =======================================================  Vital Signs Last 24 Hrs  T(C): 36.4 (2023 17:16), Max: 37.1 (2023 22:07)  T(F): 97.5 (2023 17:16), Max: 98.8 (2023 22:07)  HR: 73 (2023 17:16) (73 - 88)  BP: 130/71 (2023 17:16) (130/71 - 135/67)  BP(mean): --  RR: 18 (2023 17:16) (16 - 18)  SpO2: 92% (2023 17:16) (92% - 100%)    Parameters below as of 2023 17:16  Patient On (Oxygen Delivery Method): nasal cannula  O2 Flow (L/min): 2    I&O's Summary    2023 07:  -  2023 07:00  --------------------------------------------------------  IN: 0 mL / OUT: 1620 mL / NET: -1620 mL    2023 07:01  -  2023 20:55  --------------------------------------------------------  IN: 0 mL / OUT: 900 mL / NET: -900 mL      =======================================================  Current Antibiotics:    Other medications:  ergocalciferol 15859 Unit(s) Oral every week  heparin   Injectable 5000 Unit(s) SubCutaneous every 12 hours  influenza  Vaccine (HIGH DOSE) 0.7 milliLiter(s) IntraMuscular once  iron sucrose IVPB 200 milliGRAM(s) IV Intermittent every 24 hours  lidocaine 2% Jelly 5 milliLiter(s) IntraUrethral once  tamsulosin 0.4 milliGRAM(s) Oral at bedtime    =======================================================      141  |  102  |  28.1<H>  ----------------------------<  95  3.7   |  25.0  |  3.39<H>    Ca    8.4      2023 05:00    TPro  7.3  /  Alb  2.6<L>  /  TBili  0.3<L>  /  DBili  x   /  AST  16  /  ALT  8   /  AlkPhos  130<H>      Creatinine, Serum: 3.39 mg/dL (23 @ 05:00)  Creatinine, Serum: 3.44 mg/dL (23 @ 02:55)  Creatinine, Serum: 3.09 mg/dL (23 @ 03:59)  Creatinine, Serum: 3.05 mg/dL (23 @ 20:20)    Urinalysis Basic - ( 2023 06:30 )    Color: Yellow / Appearance: Clear / S.005 / pH: x  Gluc: x / Ketone: Negative  / Bili: Negative / Urobili: Negative mg/dL   Blood: x / Protein: 15 / Nitrite: Negative   Leuk Esterase: Trace / RBC: 6-10 /HPF / WBC 0-2 /HPF   Sq Epi: x / Non Sq Epi: Occasional / Bacteria: Occasional      =======================================================

## 2023-01-22 ENCOUNTER — TRANSCRIPTION ENCOUNTER (OUTPATIENT)
Age: 78
End: 2023-01-22

## 2023-01-22 LAB
ANION GAP SERPL CALC-SCNC: 13 MMOL/L — SIGNIFICANT CHANGE UP (ref 5–17)
BUN SERPL-MCNC: 27.4 MG/DL — HIGH (ref 8–20)
CALCIUM SERPL-MCNC: 8.3 MG/DL — LOW (ref 8.4–10.5)
CHLORIDE SERPL-SCNC: 105 MMOL/L — SIGNIFICANT CHANGE UP (ref 96–108)
CO2 SERPL-SCNC: 26 MMOL/L — SIGNIFICANT CHANGE UP (ref 22–29)
CREAT SERPL-MCNC: 2.72 MG/DL — HIGH (ref 0.5–1.3)
EGFR: 23 ML/MIN/1.73M2 — LOW
GLUCOSE SERPL-MCNC: 86 MG/DL — SIGNIFICANT CHANGE UP (ref 70–99)
HCT VFR BLD CALC: 28.5 % — LOW (ref 39–50)
HGB BLD-MCNC: 9.5 G/DL — LOW (ref 13–17)
MAGNESIUM SERPL-MCNC: 2 MG/DL — SIGNIFICANT CHANGE UP (ref 1.6–2.6)
MCHC RBC-ENTMCNC: 27.9 PG — SIGNIFICANT CHANGE UP (ref 27–34)
MCHC RBC-ENTMCNC: 33.3 GM/DL — SIGNIFICANT CHANGE UP (ref 32–36)
MCV RBC AUTO: 83.8 FL — SIGNIFICANT CHANGE UP (ref 80–100)
PHOSPHATE SERPL-MCNC: 3.4 MG/DL — SIGNIFICANT CHANGE UP (ref 2.4–4.7)
PLATELET # BLD AUTO: 228 K/UL — SIGNIFICANT CHANGE UP (ref 150–400)
POTASSIUM SERPL-MCNC: 3.5 MMOL/L — SIGNIFICANT CHANGE UP (ref 3.5–5.3)
POTASSIUM SERPL-SCNC: 3.5 MMOL/L — SIGNIFICANT CHANGE UP (ref 3.5–5.3)
RBC # BLD: 3.4 M/UL — LOW (ref 4.2–5.8)
RBC # FLD: 16.4 % — HIGH (ref 10.3–14.5)
S PNEUM AG UR QL: NEGATIVE — SIGNIFICANT CHANGE UP
SARS-COV-2 RNA SPEC QL NAA+PROBE: SIGNIFICANT CHANGE UP
SODIUM SERPL-SCNC: 144 MMOL/L — SIGNIFICANT CHANGE UP (ref 135–145)
WBC # BLD: 7.74 K/UL — SIGNIFICANT CHANGE UP (ref 3.8–10.5)
WBC # FLD AUTO: 7.74 K/UL — SIGNIFICANT CHANGE UP (ref 3.8–10.5)

## 2023-01-22 PROCEDURE — 99232 SBSQ HOSP IP/OBS MODERATE 35: CPT

## 2023-01-22 RX ADMIN — HEPARIN SODIUM 5000 UNIT(S): 5000 INJECTION INTRAVENOUS; SUBCUTANEOUS at 17:22

## 2023-01-22 RX ADMIN — TAMSULOSIN HYDROCHLORIDE 0.4 MILLIGRAM(S): 0.4 CAPSULE ORAL at 00:16

## 2023-01-22 RX ADMIN — TAMSULOSIN HYDROCHLORIDE 0.4 MILLIGRAM(S): 0.4 CAPSULE ORAL at 21:29

## 2023-01-22 RX ADMIN — Medication 3 MILLIGRAM(S): at 21:29

## 2023-01-22 RX ADMIN — IRON SUCROSE 110 MILLIGRAM(S): 20 INJECTION, SOLUTION INTRAVENOUS at 11:44

## 2023-01-22 RX ADMIN — HEPARIN SODIUM 5000 UNIT(S): 5000 INJECTION INTRAVENOUS; SUBCUTANEOUS at 06:14

## 2023-01-22 NOTE — PROGRESS NOTE ADULT - ASSESSMENT
78 y/o M w/ PMH of BPH was BIBA for sob.  Pt was found to be hypoxic in the feild and placed on a NRB.        Acute Hypoxic Respiratory Failure multifactorial 2/2 pleural effusions 2/2 acute decompensated HFrEF   - PNA unlikley and all cxs NGTD; Abx d/c'd per ID recs   - No longer on supplemental O2   - CT removed 01/21 by CT surgery   - Fluid was transudative by lights criteria and no growth on culture   - CT chest reviewed and noted above   - Repeat CXR reviewed and noted above   - TTE reporting EF 45-50% and severe MR  - Maintain K > 4.0, Mg > 2.0  - Strict I&Os, Daily weights and fluid restrict   - Lasix on hold 2/2 GERSON   - Will need ischemic w/u once renal fxn improves to be done as outpt   - Cardio and nephro following and recs noted       SIRS likely reactive 2/2 acute CHF exacerbation   - Leukocytosis resolved   - All cxs NGTD and procal 0.09  - Abx d/c'd per ID recs   - Pleural fluid transudative   - ID following and recs noted       GERSON on ?CKD / b/l hydro concerning for obstructive uropathy 2/2 BPH  - Baseline renal fxn unknown    - Cr remains elevated but trending down   - CT and Renal sono reporting mod b/l hydro   - s/p goyal placement 01/19   - c/w flomax and monitor I/O's   - As per pt he was told he was told his kidney function is impaired by PMD   - Urine studies reviewed   - Avoid nephrotoxic meds or renally dose if needed  - Nephro following and recs noted       Normocytic anemia suspect of chronic dx   - H/h stable   - Hemodynamically stable and no active bleeding   - Iron panel reviewed   - Monitor CBC and transfuse if Hb<8      VTE ppx: heparin sq    Dispo: Anticipate d/c in 1-2 days.

## 2023-01-22 NOTE — PROGRESS NOTE ADULT - PROBLEM SELECTOR PLAN 3
S/p removal of PTC.    Post removal CXR stable.  CTS signed off  Stable on Room air
-S/p pigtail  -Management per thoracic

## 2023-01-22 NOTE — PROGRESS NOTE ADULT - PROBLEM SELECTOR PLAN 1
Echo showed 45-50%, DDII, severe MR BNP 6457  BNP 6457  -1800ml  SOB improved.  Lasix 40mg IVP x 3 with follow up with Nephrology   There is worsening Cr after Lasix and currently on Hold   Will need ischemic eval once Cr improves or even consider NM Myocardial if not a candidate for cath based on Cr.

## 2023-01-22 NOTE — DISCHARGE NOTE PROVIDER - ATTENDING DISCHARGE PHYSICAL EXAMINATION:
Vital Signs Last 24 Hrs  T(C): 36.3 (23 Jan 2023 10:55), Max: 37.1 (22 Jan 2023 16:51)  T(F): 97.4 (23 Jan 2023 10:55), Max: 98.8 (22 Jan 2023 16:51)  HR: 81 (23 Jan 2023 10:55) (75 - 81)  BP: 113/60 (23 Jan 2023 10:55) (113/60 - 113/64)  BP(mean): --  RR: 18 (23 Jan 2023 10:55) (18 - 18)  SpO2: 95% (23 Jan 2023 10:55) (95% - 95%)    Parameters below as of 22 Jan 2023 16:51  Patient On (Oxygen Delivery Method): room air    GENERAL: pt examined bedside, laying comfortably in bed in NAD  HEENT: NC/AT, moist oral mucosa, clear conjunctiva, sclera nonicteric  RESPIRATORY: Normal respiratory effort, no wheezing, rhonchi, rales  CARDIOVASCULAR: RRR, normal S1 and S2  ABDOMEN: soft, NT/ND, +BS, no rebound/guarding  EXTREMITIES: No cynaosis, no clubbing, no lower extremity edema, pulses are 2+ bilaterally  NEUROLOGY: A+O to person, place, and time, no focal neurologic deficits appreciated   SKIN: No rashes or no palpable lesions

## 2023-01-22 NOTE — PROGRESS NOTE ADULT - SUBJECTIVE AND OBJECTIVE BOX
Reason for visit:  GERSON    Subjective: No acute overnight event. Good UOP. No fever/chills. Seen earlier today.    ROS: All systems were reviewed in detail pertinent positive and negative mentioned above, rest are negative.    Physical Exam:  General: Thin elderly male in NAD  Cardiac: S1S2 RRR  Respiratory: CTAB  Abdomen: Soft, NT  Extremities: No appreciable edema  Neuro: AAOx3  goyal, chest tube    =======================================================  Vital Signs Last 24 Hrs  T(C): 37.1 (2023 16:51), Max: 37.1 (2023 16:51)  T(F): 98.8 (2023 16:51), Max: 98.8 (2023 16:51)  HR: 75 (2023 16:51) (74 - 75)  BP: 113/64 (2023 16:51) (113/64 - 127/65)  BP(mean): --  RR: 18 (2023 16:51) (18 - 18)  SpO2: 95% (2023 16:51) (92% - 95%)    Parameters below as of 2023 16:51  Patient On (Oxygen Delivery Method): room air      I&O's Summary    2023 07:01  -  2023 07:00  --------------------------------------------------------  IN: 0 mL / OUT: 1800 mL / NET: -1800 mL    2023 07:01  -  2023 20:34  --------------------------------------------------------  IN: 0 mL / OUT: 500 mL / NET: -500 mL      =======================================================  Current Antibiotics:    Other medications:  ergocalciferol 68306 Unit(s) Oral every week  heparin   Injectable 5000 Unit(s) SubCutaneous every 12 hours  influenza  Vaccine (HIGH DOSE) 0.7 milliLiter(s) IntraMuscular once  iron sucrose IVPB 200 milliGRAM(s) IV Intermittent every 24 hours  lidocaine 2% Jelly 5 milliLiter(s) IntraUrethral once  tamsulosin 0.4 milliGRAM(s) Oral at bedtime    =======================================================      144  |  105  |  27.4<H>  ----------------------------<  86  3.5   |  26.0  |  2.72<H>    Ca    8.3<L>      2023 05:00  Phos  3.4       Mg     2.0         TPro  7.3  /  Alb  2.6<L>  /  TBili  0.3<L>  /  DBili  x   /  AST  16  /  ALT  8   /  AlkPhos  130<H>      Creatinine, Serum: 2.72 mg/dL (23 @ 05:00)  Creatinine, Serum: 3.39 mg/dL (23 @ 05:00)  Creatinine, Serum: 3.44 mg/dL (23 @ 02:55)  Creatinine, Serum: 3.09 mg/dL (23 @ 03:59)  Creatinine, Serum: 3.05 mg/dL (23 @ 20:20)    Urinalysis Basic - ( 2023 06:30 )    Color: Yellow / Appearance: Clear / S.005 / pH: x  Gluc: x / Ketone: Negative  / Bili: Negative / Urobili: Negative mg/dL   Blood: x / Protein: 15 / Nitrite: Negative   Leuk Esterase: Trace / RBC: 6-10 /HPF / WBC 0-2 /HPF   Sq Epi: x / Non Sq Epi: Occasional / Bacteria: Occasional      =======================================================

## 2023-01-22 NOTE — DISCHARGE NOTE PROVIDER - PROVIDER TOKENS
PROVIDER:[TOKEN:[[id number]],FOLLOWUP:[2 weeks]],PROVIDER:[TOKEN:[[id number]],FOLLOWUP:[1 month]],PROVIDER:[TOKEN:[[id number]],FOLLOWUP:[1 month]],PROVIDER:[TOKEN:[[id number]],FOLLOWUP:[2 weeks]],PROVIDER:[TOKEN:[[id number]],FOLLOWUP:[2 weeks]]

## 2023-01-22 NOTE — PROGRESS NOTE ADULT - SUBJECTIVE AND OBJECTIVE BOX
[facility]                                                         CARDIOLOGY AT [facility]                                                                  [address] [address]                                                         Telephone: [phone]. Fax:[phone]                                                                             PROGRESS NOTE    Reason for follow up:   Update:   Patient sitting up in bed, feeling better.  Hematuria noted in Ulrich on room air with no complaints.    Review of symptoms:   Cardiac:  No chest pain. No dyspnea. No palpitations.  Respiratory: no cough. No dyspnea  Gastrointestinal: No diarrhea. No abdominal pain. No bleeding.   Neuro: No focal neuro complaints.    Vitals:  T(C): 36.6 (01-22-23 @ 11:05), Max: 36.7 (01-22-23 @ 05:04)  HR: 74 (01-22-23 @ 11:05) (73 - 74)  BP: 119/56 (01-22-23 @ 11:05) (119/56 - 130/71)  RR: 18 (01-22-23 @ 11:05) (18 - 18)  SpO2: 94% (01-22-23 @ 11:05) (92% - 94%)  I&O's Summary    21 Jan 2023 07:01  -  22 Jan 2023 07:00  --------------------------------------------------------  IN: 0 mL / OUT: 1800 mL / NET: -1800 mL    PHYSICAL EXAM:  Appearance: Comfortable. No acute distress  HEENT:  Atraumatic. Normocephalic.  Normal oral mucosa  Neurologic: A & O x 3, no gross focal deficits.  Cardiovascular: RRR S1 S2, No murmur, no rubs/gallops. No JVD  Respiratory: Lungs clear to auscultation, unlabored   Gastrointestinal:  Soft, Non-tender, + BS  Lower Extremities: + Peripheral Pulses, No clubbing, cyanosis, or edema  Psychiatry: Patient is calm. No agitation.   Skin: warm and dry.    CURRENT CARDIAC MEDICATIONS:    CURRENT OTHER MEDICATIONS:  acetaminophen     Tablet .. 650 milliGRAM(s) Oral every 6 hours PRN Temp greater or equal to 38C (100.4F), Mild Pain (1 - 3)  melatonin 3 milliGRAM(s) Oral at bedtime PRN Insomnia  ondansetron Injectable 4 milliGRAM(s) IV Push every 8 hours PRN Nausea and/or Vomiting  ergocalciferol 48568 Unit(s) Oral every week  heparin   Injectable 5000 Unit(s) SubCutaneous every 12 hours  influenza  Vaccine (HIGH DOSE) 0.7 milliLiter(s) IntraMuscular once  iron sucrose IVPB 200 milliGRAM(s) IV Intermittent every 24 hours, Stop order after: 4 Doses  tamsulosin 0.4 milliGRAM(s) Oral at bedtime    LABS:	 	  ( 20 Jan 2023 06:21 )  Troponin T  0.05 ,  CPK  X    , CKMB  X    , BNP X        , ( 19 Jan 2023 16:55 )  Troponin T  0.08<H>,  CPK  X    , CKMB  X    , BNP X        , ( 19 Jan 2023 03:59 )  Troponin T  0.12<H>,  CPK  143  , CKMB  X    , BNP X                            9.5    7.74  )-----------( 228      ( 22 Jan 2023 05:00 )             28.5     01-22    144  |  105  |  27.4<H>  ----------------------------<  86  3.5   |  26.0  |  2.72<H>    Ca    8.3<L>      22 Jan 2023 05:00  Phos  3.4     01-22  Mg     2.0     01-22    TPro  7.3  /  Alb  2.6<L>  /  TBili  0.3<L>  /  DBili  x   /  AST  16  /  ALT  8   /  AlkPhos  130<H>  01-21      Lipid Profile: Date: 01-19 @ 03:59  Total cholesterol 140; Direct LDL: --; HDL: 42; Triglycerides:81    TSH: Thyroid Stimulating Hormone, Serum: 1.22 uIU/mL    TELEMETRY: Not on telemetry

## 2023-01-22 NOTE — DISCHARGE NOTE PROVIDER - DISCHARGING ATTENDING PHYSICIAN:
Legs, arms, abd wounds applied bacitracin, followed by non adhesion vasaline strips, then gauze. Patient in pain, tolerating dressing well.    Jes Melo Librado Segura MD

## 2023-01-22 NOTE — DISCHARGE NOTE PROVIDER - NSDCCPCAREPLAN_GEN_ALL_CORE_FT
PRINCIPAL DISCHARGE DIAGNOSIS  Diagnosis: Volume overload  Assessment and Plan of Treatment: received lasix. Lasix on hold due to renal function decline.  SOB improved post pigtail CT.      SECONDARY DISCHARGE DIAGNOSES  Diagnosis: GERSON (acute kidney injury)  Assessment and Plan of Treatment: Nephrology consulted. Lasix held.  Strict I&Os, Daily weights and fluid restrict.  b/l hydro concerning for obstructive uropathy 2/2 BPH  Baseline renal fxn unknown.  CT and Renal sono reporting mod b/l hydro.  s/p goyal placement 01/19. Urology consulted.   Cr remains elevated. c/w flomax and monitor I/O's.  Avoid nephrotoxic meds or renally dose if needed.  Anemia: -- H/h stable   Hemodynamically stable and no active bleeding       Diagnosis: Pleural effusion  Assessment and Plan of Treatment: CT surgery followed up. chest tube removed. pleural fluid was transudative by lights criteria and no growth on culture.    Diagnosis: PNA (pneumonia)  Assessment and Plan of Treatment: received abx during hospital stay. ID reviewed the case. SIRS likely reactive 2/2 acute CHF exacerbation. Leukocytosis resolved. pleural fluid was transudative by lights criteria and no growth on culture.    Diagnosis: Hypokalemia  Assessment and Plan of Treatment: supplemented    Diagnosis: CHF, acute  Assessment and Plan of Treatment: received lasix. Lasix on hold due to renal function decline.  SOB improved post pigtail CT.     PRINCIPAL DISCHARGE DIAGNOSIS  Diagnosis: Acute respiratory failure with hypoxia  Assessment and Plan of Treatment: Resolved.  Found to have fluid in you lungs requiring drainage via chest tube that has now been removed.  Fluid was likely as a result of decompensted heart failure.  Echo showed ejection fraction of 45-50%.  Nuclear stress test was performed and was normal.  Follow up with cardiology out patient.      SECONDARY DISCHARGE DIAGNOSES  Diagnosis: GERSON (acute kidney injury)  Assessment and Plan of Treatment: Nephrology consulted. Lasix held.  CT and renal ultrasound showing moderate bilateral hydronephrosis likely from prostate enlargement.  Ulrich was placed 1/19 and urology recommending keeping in place and following up out patient.        Diagnosis: Pleural effusion  Assessment and Plan of Treatment: CT surgery followed up. chest tube removed. pleural fluid was transudative by lights criteria and no growth on culture.

## 2023-01-22 NOTE — DISCHARGE NOTE PROVIDER - NSDCMRMEDTOKEN_GEN_ALL_CORE_FT
Flomax 0.4 mg oral capsule: 1 cap(s) orally once a day   ergocalciferol 1.25 mg (50,000 intl units) oral capsule: 1 cap(s) orally once a week  Flomax 0.4 mg oral capsule: 1 cap(s) orally once a day   ergocalciferol 1.25 mg (50,000 intl units) oral capsule: 1 cap(s) orally once a week  Flomax 0.4 mg oral capsule: 1 cap(s) orally once a day  Lasix 40 mg oral tablet: 1 tab(s) orally once a day   metoprolol succinate 25 mg oral tablet, extended release: 1 tab(s) orally once a day

## 2023-01-22 NOTE — DISCHARGE NOTE PROVIDER - CARE PROVIDERS DIRECT ADDRESSES
,[email or web address],[email or web address]

## 2023-01-22 NOTE — PROGRESS NOTE ADULT - SUBJECTIVE AND OBJECTIVE BOX
Chief Complaint:  resp failure    SUBJECTIVE / OVERNIGHT EVENTS:  no acute events reported overnight.  Pt denies sob, cp, palpitatioins, abd pain.          PHYSICAL EXAM:  Vital Signs Last 24 Hrs  T(C): 36.6 (2023 11:05), Max: 36.7 (2023 05:04)  T(F): 97.9 (2023 11:05), Max: 98 (2023 05:04)  HR: 74 (2023 11:05) (73 - 74)  BP: 119/56 (2023 11:05) (119/56 - 130/71)  BP(mean): --  RR: 18 (2023 11:05) (18 - 18)  SpO2: 94% (2023 11:05) (92% - 94%)    Parameters below as of 2023 11:05  Patient On (Oxygen Delivery Method): nasal cannula      GENERAL: pt examined bedside, laying comfortably in bed in NAD  HEENT: NC/AT, moist oral mucosa, clear conjunctiva, sclera nonicteric  RESPIRATORY: Normal respiratory effort, no wheezing, rhonchi, rales  CARDIOVASCULAR: RRR, normal S1 and S2  ABDOMEN: soft, NT/ND, +BS, no rebound/guarding  EXTREMITIES: No cynaosis, no clubbing, no lower extremity edema, pulses are 2+ bilaterally  NEUROLOGY: A+O to person, place, and time, no focal neurologic deficits appreciated   SKIN: No rashes or no palpable lesions        LABS:                                            9.5    7.74  )-----------( 228      ( 2023 05:00 )             28.5         144  |  105  |  27.4<H>  ----------------------------<  86  3.5   |  26.0  |  2.72<H>    Ca    8.3<L>      2023 05:00  Phos  3.4       Mg     2.0         TPro  7.3  /  Alb  2.6<L>  /  TBili  0.3<L>  /  DBili  x   /  AST  16  /  ALT  8   /  AlkPhos  130<H>  21      CARDIAC MARKERS ( 2023 06:21 )  x     / 0.05 ng/mL / x     / x     / x          Urinalysis Basic - ( 2023 06:30 )    Color: Yellow / Appearance: Clear / S.005 / pH: x  Gluc: x / Ketone: Negative  / Bili: Negative / Urobili: Negative mg/dL   Blood: x / Protein: 15 / Nitrite: Negative   Leuk Esterase: Trace / RBC: 6-10 /HPF / WBC 0-2 /HPF   Sq Epi: x / Non Sq Epi: Occasional / Bacteria: Occasional        Culture - Urine (collected 2023 09:30)  Source: Clean Catch Clean Catch (Midstream)  Final Report (2023 16:57):    <10,000 CFU/mL Normal Urogenital Maryam    Culture - Fungal, Body Fluid (collected 2023 06:15)  Source: Pleural Fl Pleural Fluid  Preliminary Report (2023 15:03):    Culture is being performed. Fungal cultures are held for 4 weeks.    Culture - Body Fluid with Gram Stain (collected 2023 06:15)  Source: Pleural Fl Pleural Fluid  Gram Stain (2023 20:10):    polymorphonuclear leukocytes seen    No organisms seen    by cytocentrifuge  Preliminary Report (2023 11:48):    No growth    Culture - Blood (collected 2023 20:20)  Source: .Blood Blood-Peripheral  Preliminary Report (2023 02:02):    No growth to date.    Culture - Blood (collected 2023 20:15)  Source: .Blood Blood-Peripheral  Preliminary Report (2023 02:02):    No growth to date.      CAPILLARY BLOOD GLUCOSE            RADIOLOGY & ADDITIONAL TESTS:    < from: US Duplex Venous Lower Ext Ltd, Left (23 @ 10:24) >  IMPRESSION:  No evidence of left lower extremity deep venous thrombosis.    < end of copied text >      < from: US Renal (23 @ 10:23) >  IMPRESSION:  Moderate bilateral hydronephrosis.    Urinary bladder is collapsed around a Ulrich catheter.    < end of copied text >      < from: Xray Chest 1 View- PORTABLE-Urgent (Xray Chest 1 View- PORTABLE-Urgent .) (23 @ 06:54) >  IMPRESSION: No pneumothorax.    Decreased LEFT effusion with residual LEFT retrocardiac airspace   consolidation/atelectasis.  Residual RIGHT lower zone small effusion and/or RIGHT lower lobe   reexpansion pneumonitis.  RIGHT multi-sidehole pigtail catheter overlies RIGHT lower hemithorax..    < end of copied text >      < from: CT Chest No Cont (23 @ 00:27) >  IMPRESSION:  Moderate volume bilateral pleural effusions, partially loculated on the   left. Interlobular septal thickening representing interstitial pulmonary   edema/CHF.  Small volume perihepatic and perisplenic ascites.  Incompletely visualized mild symmetric bilateral hydronephrosis.    < end of copied text >      < from: TTE Echo Complete w/ Contrast w/ Doppler (23 @ 10:16) >  Summary:   1. Endocardial visualization was enhanced with intravenous echo contrast.   2. Moderately enlarged left atrium.   3. Left ventricular ejection fraction, by visual estimation, is 45 to   50%.   4. Grade II diastolic dysfunction. Elevated mean left atrial pressure.   5. Normal right atrial size.   6. Normal right ventricular size and function.   7. Severe mitral valve regurgitation. tethering and tenting of the   leaflets suggestive of functional or ischemic MR.   8. Mild tricuspid regurgitation.   9. Estimated pulmonary artery systolic pressure is 46 mmHg assuming   -mild pulmonary hypertension.  10. Trivial pericardial effusion.    < end of copied text >      MEDICATIONS  (STANDING):  ergocalciferol 14490 Unit(s) Oral every week  heparin   Injectable 5000 Unit(s) SubCutaneous every 12 hours  influenza  Vaccine (HIGH DOSE) 0.7 milliLiter(s) IntraMuscular once  iron sucrose IVPB 200 milliGRAM(s) IV Intermittent every 24 hours  lidocaine 2% Jelly 5 milliLiter(s) IntraUrethral once  tamsulosin 0.4 milliGRAM(s) Oral at bedtime    MEDICATIONS  (PRN):  acetaminophen     Tablet .. 650 milliGRAM(s) Oral every 6 hours PRN Temp greater or equal to 38C (100.4F), Mild Pain (1 - 3)  melatonin 3 milliGRAM(s) Oral at bedtime PRN Insomnia  ondansetron Injectable 4 milliGRAM(s) IV Push every 8 hours PRN Nausea and/or Vomiting

## 2023-01-22 NOTE — DISCHARGE NOTE PROVIDER - HOSPITAL COURSE
76 y/o M w/ PMH of BPH was BIBA for sob.  Pt was found to be hypoxic in the feild and placed on a NRB.  Admitted for Acute Hypoxic Respiratory Failure multifactorial 2/2 pleural effusions 2/2 acute decompensated HFrEF.  ID consulted. PNA unlikley and all cxs NGTD; Abx were d/c'd.  No longer on supplemental O2.  CT removed by CT surgery.  Fluid was transudative by lights criteria and no growth on culture. Pt. had CT chest and CXR to f/u on pleural effusions.  TTE reporting EF 45-50% and severe MR.  BNP 6457.  Maintain K > 4.0, Mg > 2.0.  Strict I&Os, Daily weights and fluid restrict.  Lasix on hold 2/2 GRESON.  Elevated troponin -Have downtrended and normalized.  EKG non ischemic.  Will need ischemic w/u once renal fxn improves. Consider NM Myocardial if not a candidate for cath based on Cr.   Cardio and nephro following.  SIRS likely reactive 2/2 acute CHF exacerbation. Leukocytosis resolving, all cxs NGTD and procal 0.09,  Abx d/c'd per ID recs, pleural fluid transudative.   Nephrology consulted for GERSON on ?CKD / b/l hydro concerning for obstructive uropathy 2/2 BPH. Baseline renal fxn unknown.  CT and Renal sono reporting mod b/l hydro. Urology consulted, s/p goyal placement 01/19.  Cr remains elevated at up up to 3.39, UA 15 protein.  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 As per pt he was told he was told his kidney function is impaired by PMD. Nephrotoxic meds were avoided and renally dosed.   Normocytic anemia suspect of chronic dx.  H/h stable. 9.5/28.5.  Hemodynamically stable and no active bleeding.     Disposition: Stable to be Dced Home with Nephrology, Urology, Cardiology follow up.     78 y/o M w/ PMH of BPH was BIBA for sob.  Pt was found to be hypoxic in the feild and placed on a NRB.  Admitted for Acute Hypoxic Respiratory Failure multifactorial 2/2 pleural effusions 2/2 acute decompensated HFrEF.  ID consulted. PNA was ruled out and all cxs NGTD; Abx were d/c'd.  No longer on supplemental O2.  CT removed by CT surgery.  Fluid was transudative by lights criteria and no growth on culture. Pt. had CT chest and CXR to f/u on pleural effusions.  TTE reporting EF 45-50% and severe MR.  BNP 6457.  Maintain K > 4.0, Mg > 2.0.  Strict I&Os, Daily weights and fluid restrict.  Lasix on hold 2/2 GERSON.  Elevated troponin -Have downtrended and normalized.  EKG non ischemic.  Will need ischemic w/u once renal fxn improves. Consider NM Myocardial if not a candidate for cath based on Cr.   Cardio and nephro following.  SIRS likely reactive 2/2 acute CHF exacerbation. Leukocytosis resolving, all cxs NGTD and procal 0.09,  Abx d/c'd per ID recs, pleural fluid transudative.   Nephrology consulted for GERSON on ?CKD / b/l hydro concerning for obstructive uropathy 2/2 BPH. Baseline renal fxn unknown.  CT and Renal sono reporting mod b/l hydro. Urology consulted, s/p goyal placement 01/19.  Cr remains elevated at up up to 3.39, UA 15 protein.  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 As per pt he was told he was told his kidney function is impaired by PMD. Nephrotoxic meds were avoided and renally dosed.   Normocytic anemia suspect of chronic dx.  H/h stable. 9.5/28.5.  Hemodynamically stable and no active bleeding.  Nuclear stress test performed and was normal.  Pt stable for d/c and will f/u with cardio, urology and pmd.

## 2023-01-22 NOTE — PROGRESS NOTE ADULT - ASSESSMENT
76 YO M w/ BPH presents with SOB. Admitted for acute hypoxic respiratory failure in setting of PNA complicated by b/l effusions, partially loculated on the left + decompensated heart failure. Also found to have b/l hydronephrosis. Hospital course is notable for R chest tube placement on 01/19 and Goyal placement on 01/19. TTE showed severe mitral valve regurg + mild pulmonary HTN + diastolic dysfunction. Nephrology is consulted for GERSON on ? CKD.      1. 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 D/w patient again and he mentioned lab work done around 3months ago w/ PCP and no mention of advanced CKD was made.   -SCr peaked at 3.4, now improving 2.7mg/dl today  -UA 15 protein, UPCR 0.3  -CT showed b/l hydronephrosis, Follow up renal ultrasound showed R kidney 10.9cm + L kidney 9.9cm + b/l hydronephrosis   -s/p goyal placement on 01/19/23 - non oliguric   -GERSON form chr PIMENTEL/ VUR--> expecting Scr to continue to improve from here  -Will follow closely and discuss w/ patient further      2.Blood pressure acceptable   3. Metabolic acidosis in setting of CKD - serum bicarb is WNL  4. Anemia - workup for iron def  5. 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 B/l hydronephrosis - s/p goyal - management of goyal as per Urology

## 2023-01-22 NOTE — DISCHARGE NOTE PROVIDER - CARE PROVIDER_API CALL
Ranulfo Tafoya ()  Internal Medicine  [address], [address]  [place] [address]  Phone: [phone]  Fax: [phone]  Follow Up Time: 2 weeks    Shaggy Ny)  Infectious Disease; Internal Medicine  [address] [place] [address]  Phone: [phone]  Fax: [phone]  Follow Up Time: 1 month    Ely Villagran)  Thoracic and Cardiac Surgery  [address] [place] [address]  Phone: [phone]  Fax: [phone]  Follow Up Time: 1 month    Jonathan Nicholas)  Urology  [address]  [place] [address]  Phone: [phone]  Fax: [phone]  Follow Up Time: 2 weeks    Gui Ogden)  Cardiovascular Disease; Internal Medicine  [address], [address]  [place] [address]  Phone: [phone]  Fax: [phone]  Follow Up Time: 2 weeks

## 2023-01-22 NOTE — DISCHARGE NOTE PROVIDER - NSDCFUADDAPPT_GEN_ALL_CORE_FT
Will need ischemic w/u once renal fxn improves.   Consider NM Myocardial if not a candidate for cath based on Cr.   Maintain K > 4.0, Mg > 2.0  Strict I&Os, Daily weights and fluid restrict.

## 2023-01-22 NOTE — PROGRESS NOTE ADULT - ASSESSMENT
78 y/o male with hx of BPH, ?CKD, current smoker who presents with SOB and leg swelling. Pt states that he noticed his feet and ankles swelling up 4 days ago and he started having trouble breathing yesterday. In the ED pt was found very hypoxemic with SpO2 74% on room air. Labs show creatinine 3.05, D-dimer 789, troponin negative and proBNP 6457. EKG shows  bpm with no acute ST/T changes.

## 2023-01-22 NOTE — DISCHARGE NOTE PROVIDER - DETAILS OF MALNUTRITION DIAGNOSIS/DIAGNOSES
This patient has been assessed with a concern for Malnutrition and was treated during this hospitalization for the following Nutrition diagnosis/diagnoses:     -  01/25/2023: Severe protein-calorie malnutrition   -  01/25/2023: Underweight (BMI < 19)

## 2023-01-23 ENCOUNTER — TRANSCRIPTION ENCOUNTER (OUTPATIENT)
Age: 78
End: 2023-01-23

## 2023-01-23 LAB
ANION GAP SERPL CALC-SCNC: 11 MMOL/L — SIGNIFICANT CHANGE UP (ref 5–17)
BUN SERPL-MCNC: 28.2 MG/DL — HIGH (ref 8–20)
CALCIUM SERPL-MCNC: 8.3 MG/DL — LOW (ref 8.4–10.5)
CHLORIDE SERPL-SCNC: 107 MMOL/L — SIGNIFICANT CHANGE UP (ref 96–108)
CO2 SERPL-SCNC: 27 MMOL/L — SIGNIFICANT CHANGE UP (ref 22–29)
CREAT SERPL-MCNC: 2.61 MG/DL — HIGH (ref 0.5–1.3)
EGFR: 25 ML/MIN/1.73M2 — LOW
GLUCOSE SERPL-MCNC: 82 MG/DL — SIGNIFICANT CHANGE UP (ref 70–99)
HCT VFR BLD CALC: 30.1 % — LOW (ref 39–50)
HGB BLD-MCNC: 9.7 G/DL — LOW (ref 13–17)
MAGNESIUM SERPL-MCNC: 2 MG/DL — SIGNIFICANT CHANGE UP (ref 1.6–2.6)
MCHC RBC-ENTMCNC: 28 PG — SIGNIFICANT CHANGE UP (ref 27–34)
MCHC RBC-ENTMCNC: 32.2 GM/DL — SIGNIFICANT CHANGE UP (ref 32–36)
MCV RBC AUTO: 86.7 FL — SIGNIFICANT CHANGE UP (ref 80–100)
NON-GYNECOLOGICAL CYTOLOGY STUDY: SIGNIFICANT CHANGE UP
PLATELET # BLD AUTO: 239 K/UL — SIGNIFICANT CHANGE UP (ref 150–400)
POTASSIUM SERPL-MCNC: 3.5 MMOL/L — SIGNIFICANT CHANGE UP (ref 3.5–5.3)
POTASSIUM SERPL-SCNC: 3.5 MMOL/L — SIGNIFICANT CHANGE UP (ref 3.5–5.3)
RBC # BLD: 3.47 M/UL — LOW (ref 4.2–5.8)
RBC # FLD: 16.4 % — HIGH (ref 10.3–14.5)
SODIUM SERPL-SCNC: 145 MMOL/L — SIGNIFICANT CHANGE UP (ref 135–145)
WBC # BLD: 9.57 K/UL — SIGNIFICANT CHANGE UP (ref 3.8–10.5)
WBC # FLD AUTO: 9.57 K/UL — SIGNIFICANT CHANGE UP (ref 3.8–10.5)

## 2023-01-23 PROCEDURE — 99234 HOSP IP/OBS SM DT SF/LOW 45: CPT

## 2023-01-23 PROCEDURE — 99232 SBSQ HOSP IP/OBS MODERATE 35: CPT

## 2023-01-23 PROCEDURE — 93018 CV STRESS TEST I&R ONLY: CPT

## 2023-01-23 PROCEDURE — 93016 CV STRESS TEST SUPVJ ONLY: CPT

## 2023-01-23 PROCEDURE — 99239 HOSP IP/OBS DSCHRG MGMT >30: CPT

## 2023-01-23 PROCEDURE — 99233 SBSQ HOSP IP/OBS HIGH 50: CPT

## 2023-01-23 PROCEDURE — 78452 HT MUSCLE IMAGE SPECT MULT: CPT | Mod: 26

## 2023-01-23 RX ORDER — ERGOCALCIFEROL 1.25 MG/1
1 CAPSULE ORAL
Qty: 4 | Refills: 0
Start: 2023-01-23 | End: 2023-02-21

## 2023-01-23 RX ADMIN — IRON SUCROSE 110 MILLIGRAM(S): 20 INJECTION, SOLUTION INTRAVENOUS at 12:12

## 2023-01-23 RX ADMIN — HEPARIN SODIUM 5000 UNIT(S): 5000 INJECTION INTRAVENOUS; SUBCUTANEOUS at 18:17

## 2023-01-23 RX ADMIN — HEPARIN SODIUM 5000 UNIT(S): 5000 INJECTION INTRAVENOUS; SUBCUTANEOUS at 06:01

## 2023-01-23 RX ADMIN — TAMSULOSIN HYDROCHLORIDE 0.4 MILLIGRAM(S): 0.4 CAPSULE ORAL at 22:12

## 2023-01-23 NOTE — DISCHARGE NOTE NURSING/CASE MANAGEMENT/SOCIAL WORK - PATIENT PORTAL LINK FT
You can access the FollowMyHealth Patient Portal offered by [facility] by registering at the following website: [email or web address]. By joining [employer]’s FollowMyHealth portal, you will also be able to view your health information using other applications (apps) compatible with our system.

## 2023-01-23 NOTE — PROGRESS NOTE ADULT - SUBJECTIVE AND OBJECTIVE BOX
Chief Complaint:  resp failure    SUBJECTIVE / OVERNIGHT EVENTS:  no acute events reported overnight.  Pt denies sob, cp, palpitatioins, abd pain.          PHYSICAL EXAM:  Vital Signs Last 24 Hrs  T(C): 36.3 (2023 10:55), Max: 37.1 (2023 16:51)  T(F): 97.4 (2023 10:55), Max: 98.8 (2023 16:51)  HR: 81 (2023 10:55) (75 - 81)  BP: 113/60 (2023 10:55) (113/60 - 113/64)  BP(mean): --  RR: 18 (2023 10:55) (18 - 18)  SpO2: 95% (2023 10:55) (95% - 95%)    Parameters below as of 2023 16:51  Patient On (Oxygen Delivery Method): room air        GENERAL: pt examined bedside, laying comfortably in bed in NAD  HEENT: NC/AT, moist oral mucosa, clear conjunctiva, sclera nonicteric  RESPIRATORY: Normal respiratory effort, no wheezing, rhonchi, rales  CARDIOVASCULAR: RRR, normal S1 and S2  ABDOMEN: soft, NT/ND, +BS, no rebound/guarding  EXTREMITIES: No cynaosis, no clubbing, no lower extremity edema, pulses are 2+ bilaterally  NEUROLOGY: A+O to person, place, and time, no focal neurologic deficits appreciated   SKIN: No rashes or no palpable lesions        LABS:                                          9.7    9.57  )-----------( 239      ( 2023 06:40 )             30.1           145  |  107  |  28.2<H>  ----------------------------<  82  3.5   |  27.0  |  2.61<H>    Ca    8.3<L>      2023 06:40  Phos  3.4     -  Mg     2.0     23        Urinalysis Basic - ( 2023 06:30 )    Color: Yellow / Appearance: Clear / S.005 / pH: x  Gluc: x / Ketone: Negative  / Bili: Negative / Urobili: Negative mg/dL   Blood: x / Protein: 15 / Nitrite: Negative   Leuk Esterase: Trace / RBC: 6-10 /HPF / WBC 0-2 /HPF   Sq Epi: x / Non Sq Epi: Occasional / Bacteria: Occasional        Culture - Urine (collected 2023 09:30)  Source: Clean Catch Clean Catch (Midstream)  Final Report (2023 16:57):    <10,000 CFU/mL Normal Urogenital Maryam    Culture - Fungal, Body Fluid (collected 2023 06:15)  Source: Pleural Fl Pleural Fluid  Preliminary Report (2023 15:03):    Culture is being performed. Fungal cultures are held for 4 weeks.    Culture - Body Fluid with Gram Stain (collected 2023 06:15)  Source: Pleural Fl Pleural Fluid  Gram Stain (2023 20:10):    polymorphonuclear leukocytes seen    No organisms seen    by cytocentrifuge  Preliminary Report (2023 11:48):    No growth    Culture - Blood (collected 2023 20:20)  Source: .Blood Blood-Peripheral  Preliminary Report (2023 02:02):    No growth to date.    Culture - Blood (collected 2023 20:15)  Source: .Blood Blood-Peripheral  Preliminary Report (2023 02:02):    No growth to date.      CAPILLARY BLOOD GLUCOSE            RADIOLOGY & ADDITIONAL TESTS:    < from: US Duplex Venous Lower Ext Ltd, Left (23 @ 10:24) >  IMPRESSION:  No evidence of left lower extremity deep venous thrombosis.    < end of copied text >      < from: US Renal (23 @ 10:23) >  IMPRESSION:  Moderate bilateral hydronephrosis.    Urinary bladder is collapsed around a Ulrich catheter.    < end of copied text >      < from: Xray Chest 1 View- PORTABLE-Urgent (Xray Chest 1 View- PORTABLE-Urgent .) (23 @ 06:54) >  IMPRESSION: No pneumothorax.    Decreased LEFT effusion with residual LEFT retrocardiac airspace   consolidation/atelectasis.  Residual RIGHT lower zone small effusion and/or RIGHT lower lobe   reexpansion pneumonitis.  RIGHT multi-sidehole pigtail catheter overlies RIGHT lower hemithorax..    < end of copied text >      < from: CT Chest No Cont (23 @ 00:27) >  IMPRESSION:  Moderate volume bilateral pleural effusions, partially loculated on the   left. Interlobular septal thickening representing interstitial pulmonary   edema/CHF.  Small volume perihepatic and perisplenic ascites.  Incompletely visualized mild symmetric bilateral hydronephrosis.    < end of copied text >      < from: TTE Echo Complete w/ Contrast w/ Doppler (23 @ 10:16) >  Summary:   1. Endocardial visualization was enhanced with intravenous echo contrast.   2. Moderately enlarged left atrium.   3. Left ventricular ejection fraction, by visual estimation, is 45 to   50%.   4. Grade II diastolic dysfunction. Elevated mean left atrial pressure.   5. Normal right atrial size.   6. Normal right ventricular size and function.   7. Severe mitral valve regurgitation. tethering and tenting of the   leaflets suggestive of functional or ischemic MR.   8. Mild tricuspid regurgitation.   9. Estimated pulmonary artery systolic pressure is 46 mmHg assuming   -mild pulmonary hypertension.  10. Trivial pericardial effusion.    < end of copied text >      MEDICATIONS  (STANDING):  ergocalciferol 17503 Unit(s) Oral every week  heparin   Injectable 5000 Unit(s) SubCutaneous every 12 hours  influenza  Vaccine (HIGH DOSE) 0.7 milliLiter(s) IntraMuscular once  iron sucrose IVPB 200 milliGRAM(s) IV Intermittent every 24 hours  lidocaine 2% Jelly 5 milliLiter(s) IntraUrethral once  tamsulosin 0.4 milliGRAM(s) Oral at bedtime    MEDICATIONS  (PRN):  acetaminophen     Tablet .. 650 milliGRAM(s) Oral every 6 hours PRN Temp greater or equal to 38C (100.4F), Mild Pain (1 - 3)  melatonin 3 milliGRAM(s) Oral at bedtime PRN Insomnia  ondansetron Injectable 4 milliGRAM(s) IV Push every 8 hours PRN Nausea and/or Vomiting

## 2023-01-23 NOTE — PROGRESS NOTE ADULT - PROBLEM SELECTOR PLAN 1
- Echo showed 45-50%, DDII,   - severe MR BNP 6457  - BNP 6457, repeat today   - pt w/ CKD, peak creatinine was 3.4, has hydronephrosis on renal ultrasound   - can continue diuresis for now, symptoms possibly in setting of severe MR   - will check stress test today  - if stress is abnormal will need ischemic eval w/ cath  - will also need eval of severe MR with eventual VARSHA

## 2023-01-23 NOTE — PROGRESS NOTE ADULT - SUBJECTIVE AND OBJECTIVE BOX
[facility]                                                         CARDIOLOGY AT [facility]                                                                  [address] [address]                                                         Telephone: [phone]. Fax:[phone]                                                                             PROGRESS NOTE    Reason for follow up: CAD  Update: stress test today     Review of symptoms:   Cardiac:  No chest pain. No dyspnea. No palpitations.  Respiratory: no cough. No dyspnea  Gastrointestinal: No diarrhea. No abdominal pain. No bleeding.   Neuro: No focal neuro complaints.    Vitals:  T(C): 37.1 (01-22-23 @ 16:51), Max: 37.1 (01-22-23 @ 16:51)  HR: 75 (01-22-23 @ 16:51) (74 - 75)  BP: 113/64 (01-22-23 @ 16:51) (113/64 - 119/56)  RR: 18 (01-22-23 @ 16:51) (18 - 18)  SpO2: 95% (01-22-23 @ 16:51) (94% - 95%)  Wt(kg): --  I&O's Summary    22 Jan 2023 07:01  -  23 Jan 2023 07:00  --------------------------------------------------------  IN: 0 mL / OUT: 1200 mL / NET: -1200 mL    PHYSICAL EXAM:  Appearance: Comfortable. No acute distress  HEENT:  Atraumatic. Normocephalic.  Normal oral mucosa  Neurologic: A & O x 3, no gross focal deficits.  Cardiovascular: RRR S1 S2, No murmur, no rubs/gallops. No JVD  Respiratory: Lungs clear to auscultation, unlabored   Gastrointestinal:  Soft, Non-tender, + BS  Lower Extremities: 2+ Peripheral Pulses, No clubbing, cyanosis, or edema  Psychiatry: Patient is calm. No agitation.   Skin: warm and dry.    CURRENT CARDIAC MEDICATIONS:    CURRENT OTHER MEDICATIONS:  acetaminophen     Tablet .. 650 milliGRAM(s) Oral every 6 hours PRN Temp greater or equal to 38C (100.4F), Mild Pain (1 - 3)  melatonin 3 milliGRAM(s) Oral at bedtime PRN Insomnia  ondansetron Injectable 4 milliGRAM(s) IV Push every 8 hours PRN Nausea and/or Vomiting  ergocalciferol 82464 Unit(s) Oral every week  heparin   Injectable 5000 Unit(s) SubCutaneous every 12 hours  influenza  Vaccine (HIGH DOSE) 0.7 milliLiter(s) IntraMuscular once  iron sucrose IVPB 200 milliGRAM(s) IV Intermittent every 24 hours, Stop order after: 4 Doses  tamsulosin 0.4 milliGRAM(s) Oral at bedtime    LABS:	 	  ( 20 Jan 2023 06:21 )  Troponin T  0.05 ,  CPK  X    , CKMB  X    , BNP X        , ( 19 Jan 2023 16:55 )  Troponin T  0.08<H>,  CPK  X    , CKMB  X    , BNP X        , ( 19 Jan 2023 03:59 )  Troponin T  0.12<H>,  CPK  143  , CKMB  X    , BNP X                   9.7    9.57  )-----------( 239      ( 23 Jan 2023 06:40 )             30.1     01-23    145  |  107  |  28.2<H>  ----------------------------<  82  3.5   |  27.0  |  2.61<H>    Ca    8.3<L>      23 Jan 2023 06:40  Phos  3.4     01-22  Mg     2.0     01-23    Lipid Profile: Date: 01-19 @ 03:59  Total cholesterol 140; Direct LDL: --; HDL: 42; Triglycerides:81    HgA1c:   TSH: Thyroid Stimulating Hormone, Serum: 1.22 uIU/mL    DIAGNOSTIC TESTING:  [ ] Echocardiogram:   < from: TTE Echo Complete w/ Contrast w/ Doppler (01.19.23 @ 10:16) >  Summary:   1. Endocardial visualization was enhanced with intravenous echo contrast.   2. Moderately enlarged left atrium.   3. Left ventricular ejection fraction, by visual estimation, is 45 to   50%.   4. Grade II diastolic dysfunction. Elevated mean left atrial pressure.   5. Normal right atrial size.   6. Normal right ventricular size and function.   7. Severe mitral valve regurgitation. tethering and tenting of the   leaflets suggestive of functional or ischemic MR.   8. Mild tricuspid regurgitation.   9. Estimated pulmonary artery systolic pressure is 46 mmHg assuming   -mild pulmonary hypertension.  10. Trivial pericardial effusion.    MD Addison, RPVI Electronically signed on 1/19/2023 at 2:58:25 PM    < end of copied text >

## 2023-01-23 NOTE — PROGRESS NOTE ADULT - ASSESSMENT
78 YO M w/ BPH presents with SOB. Admitted for acute hypoxic respiratory failure in setting of PNA complicated by b/l effusions, partially loculated on the left + decompensated heart failure. Also found to have b/l hydronephrosis. Hospital course is notable for R chest tube placement on 01/19 and Goyal placement on 01/19. TTE showed severe mitral valve regurg + mild pulmonary HTN + diastolic dysfunction. Nephrology is consulted for GERSON on ? CKD.      1. 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 D/w patient again and he mentioned lab work done around 3months ago w/ PCP and no mention of advanced CKD was made.   -SCr peaked at 3.4, now improving 2.7mg/dl today  -UA 15 protein, UPCR 0.3  -CT showed b/l hydronephrosis, Follow up renal ultrasound showed R kidney 10.9cm + L kidney 9.9cm + b/l hydronephrosis   -s/p goyal placement on 01/19/23 - non oliguric   -GERSON form chr PIMENTEL/ VUR--> expecting Scr to continue to improve from here  -Will follow closely and discuss w/ patient further    2.Blood pressure acceptable   3. Metabolic acidosis in setting of CKD - serum bicarb is WNL  4. Anemia - workup for iron def  5. 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 B/l hydronephrosis - s/p goyal - management of goyal as per Urology

## 2023-01-23 NOTE — PROGRESS NOTE ADULT - SUBJECTIVE AND OBJECTIVE BOX
Reason for visit:  GERSON    Subjective: No acute overnight event. Good UOP. No fever/chills. Seen earlier today.    ROS: All systems were reviewed in detail pertinent positive and negative mentioned above, rest are negative.    Physical Exam:  General: Thin elderly male in NAD  Cardiac: S1S2 RRR  Respiratory: CTAB  Abdomen: Soft, NT  Extremities: No appreciable edema  Neuro: AAOx3  goyal, chest tube    =======================================================  Vital Signs Last 24 Hrs,    T(C): 36.3 (2023 10:55), Max: 37.1 (2023 16:51)  T(F): 97.4 (2023 10:55), Max: 98.8 (2023 16:51)  HR: 81 (2023 10:55) (75 - 81)  BP: 113/60 (2023 10:55) (113/60 - 113/64)    RR: 18 (2023 10:55) (18 - 18)  SpO2: 95% (2023 10:55) (95% - 95%)    O2 Parameters below as of 2023 16:51  Patient On (Oxygen Delivery Method): room air    T(C): 37.1 (2023 16:51), Max: 37.1 (2023 16:51)  T(F): 98.8 (2023 16:51), Max: 98.8 (2023 16:51)  HR: 75 (2023 16:51) (74 - 75)  BP: 113/64 (2023 16:51) (113/64 - 127/65)  RR: 18 (2023 16:51) (18 - 18)  SpO2: 95% (2023 16:51) (92% - 95%)    Parameters below as of 2023 16:51  Patient On (Oxygen Delivery Method): room air      I&O's Summary    2023 07:01  -  2023 07:00  --------------------------------------------------------  IN: 0 mL / OUT: 1800 mL / NET: -1800 mL    2023 07:01  -  2023 20:34  --------------------------------------------------------  IN: 0 mL / OUT: 500 mL / NET: -500 mL    =======================================================  Current Antibiotics:    Other medications:  ergocalciferol 97435 Unit(s) Oral every week  heparin   Injectable 5000 Unit(s) SubCutaneous every 12 hours  influenza  Vaccine (HIGH DOSE) 0.7 milliLiter(s) IntraMuscular once  iron sucrose IVPB 200 milliGRAM(s) IV Intermittent every 24 hours  lidocaine 2% Jelly 5 milliLiter(s) IntraUrethral once  tamsulosin 0.4 milliGRAM(s) Oral at bedtime    145    |  107    |  28.2<H>  ----------------------------<  82     Ca:8.3<L> (2023 06:40)  3.5     |  27.0   |  2.61<H>    TPro  7.3    /  Alb  2.6<L>  /  TBili  0.3<L>  /  DBili  x      /  AST  16     /  ALT  8      /  AlkPhos  130<H>  2023 05:00                        9.7<L>  9.57  )-----------( 239      ( 2023 06:40 )             30.1<L>    Phos:-- M.0 mg/dL PTH:-- Uric acid:-- Serum Osm:--  Ferritin:-- Iron:-- TIBC:-- Tsat:--  B12:-- TSH:-- ( @ 06:40)    Urinalysis Basic - ( 2023 06:30 )  Color: Yellow / Appearance: Clear / S.005<L> / pH: x  Gluc: x / Ketone: Negative  / Bili: Negative / Urobili: Negative mg/dL   Blood: x / Protein: 15<!> / Nitrite: Negative   Leuk Esterase: Trace<!> / RBC: 6-10 /HPF<!> / WBC 0-2 /HPF   Sq Epi: x / Non Sq Epi: Occasional / Bacteria: Occasional<!>      UProt:-- UCr:54 mg/dL P/C Ratio:0.3 Ratio<H> 24 hour Prot:-- UVol:-- CrCl:--  Arron:68 mmol/L UOsm:244 mosm/kg<L> UVol:-- UCl:-- UK:16 mmol/L ( @ 09:30)      =======================================================      144  |  105  |  27.4<H>  ----------------------------<  86  3.5   |  26.0  |  2.72<H>    Ca    8.3<L>      2023 05:00  Phos  3.4       Mg     2.0         TPro  7.3  /  Alb  2.6<L>  /  TBili  0.3<L>  /  DBili  x   /  AST  16  /  ALT  8   /  AlkPhos  130<H>      Creatinine, Serum: 2.72 mg/dL (23 @ 05:00)  Creatinine, Serum: 3.39 mg/dL (23 @ 05:00)  Creatinine, Serum: 3.44 mg/dL (23 @ 02:55)  Creatinine, Serum: 3.09 mg/dL (23 @ 03:59)  Creatinine, Serum: 3.05 mg/dL (23 @ 20:20)    Urinalysis Basic - ( 2023 06:30 )    Color: Yellow / Appearance: Clear / S.005 / pH: x  Gluc: x / Ketone: Negative  / Bili: Negative / Urobili: Negative mg/dL   Blood: x / Protein: 15 / Nitrite: Negative   Leuk Esterase: Trace / RBC: 6-10 /HPF / WBC 0-2 /HPF   Sq Epi: x / Non Sq Epi: Occasional / Bacteria: Occasional    =======================================================    < from: Xray Chest 1 View- PORTABLE-Urgent (Xray Chest 1 View- PORTABLE-Urgent .) (23 @ 05:39) >    ACC: [id number] EXAM:  XR CHEST PORTABLE URGENT 1V   ORDERED BY: MASSIMO PALOMINO     PROCEDURE DATE:  2023          INTERPRETATION:  INDICATION: Right-sided pigtail catheter insertion.    Single view of the chest was performed.    COMPARISON: 2023    FINDINGS:  Lines: There is a right-sided basilar pigtail chest tube in place. There   is no pneumothorax. Findings are unchanged.  Heart/Vascular: Within normal limits  Pulmonary: Unchanged small bilateral pleural effusions. Scarring of   mineralization is seen at the lung apices bilaterally, unchanged.  Bones: Dextroscoliosis    Impression:  Right-sided chest tube in place. No pneumothorax. Unchanged small   bilateral pleural effusions.    US Renal (23 @ 10:23)     ACC: [id number] EXAM:  US KIDNEY(S)   ORDERED BY: MACKENZIE LOPEZ     PROCEDURE DATE:  2023      INTERPRETATION:  CLINICAL INFORMATION: Acute renal insufficiency.    COMPARISON: Chest CT dated 2023    TECHNIQUE: Sonography of the kidneys and bladder.    FINDINGS:  Right kidney: 10.9 cm. Normal in size. Moderate increased echogenicity.   There is moderate right hydronephrosis. No focal lesions or stones are   identified.    Left kidney: 9.9 cm. Normal in size. Moderate increased echogenicity.   There is moderate right hydronephrosis. No focal lesions or stones are   identified.    Urinary bladder: Urinary bladder is collapsed about a Goyal catheter   limiting its evaluation.    Incidentally noted are mild bilateral pleural effusions.    IMPRESSION:  Moderate bilateral hydronephrosis.    Urinary bladder is collapsed around a Goyal catheter.

## 2023-01-23 NOTE — PROGRESS NOTE ADULT - ASSESSMENT
78 y/o M w/ PMH of BPH was BIBA for sob.  Pt was found to be hypoxic in the feild and placed on a NRB.        Acute Hypoxic Respiratory Failure multifactorial 2/2 pleural effusions 2/2 acute decompensated HFrEF   - PNA ruled out and all cxs NGTD; Abx d/c'd per ID recs   - No longer on supplemental O2   - CT removed 01/21 by CT surgery   - Fluid was transudative by lights criteria and no growth on culture   - CT chest reviewed and noted above   - Repeat CXR reviewed and noted above   - TTE reporting EF 45-50% and severe MR  - Maintain K > 4.0, Mg > 2.0  - Strict I&Os, Daily weights and fluid restrict   - Lasix on hold 2/2 GERSON   - Normal nuclear stress test  - Cardio and nephro following and recs noted       SIRS likely reactive 2/2 acute CHF exacerbation   - Leukocytosis resolved   - All cxs NGTD and procal 0.09  - Abx d/c'd per ID recs   - Pleural fluid transudative   - ID following and recs noted       GERSON on ?CKD / b/l hydro concerning for obstructive uropathy 2/2 BPH  - Baseline renal fxn unknown    - Cr remains elevated but trending down   - CT and Renal sono reporting mod b/l hydro   - s/p goyal placement 01/19   - c/w flomax and monitor I/O's   - As per pt he was told he was told his kidney function is impaired by PMD   - Urine studies reviewed   - Avoid nephrotoxic meds or renally dose if needed  - Nephro following and recs noted       Normocytic anemia suspect of chronic dx   - H/h stable   - Hemodynamically stable and no active bleeding   - Iron panel reviewed   - Monitor CBC and transfuse if Hb<8      VTE ppx: heparin sq    Dispo: Will be d/c'd today.

## 2023-01-23 NOTE — DISCHARGE NOTE NURSING/CASE MANAGEMENT/SOCIAL WORK - NSDCFUADDAPPT_GEN_ALL_CORE_FT
Will need ischemic w/u once renal fxn improves.   Consider NM Myocardial if not a candidate for cath based on Cr.   Maintain K > 4.0, Mg > 2.0  Strict I&Os, Daily weights and fluid restrict.   home care preference [facility]

## 2023-01-24 LAB
CULTURE RESULTS: SIGNIFICANT CHANGE UP
SPECIMEN SOURCE: SIGNIFICANT CHANGE UP

## 2023-01-24 PROCEDURE — 93320 DOPPLER ECHO COMPLETE: CPT | Mod: 26

## 2023-01-24 PROCEDURE — 99233 SBSQ HOSP IP/OBS HIGH 50: CPT

## 2023-01-24 PROCEDURE — 99234 HOSP IP/OBS SM DT SF/LOW 45: CPT

## 2023-01-24 PROCEDURE — 71045 X-RAY EXAM CHEST 1 VIEW: CPT | Mod: 26

## 2023-01-24 PROCEDURE — 93325 DOPPLER ECHO COLOR FLOW MAPG: CPT | Mod: 26

## 2023-01-24 PROCEDURE — 93312 ECHO TRANSESOPHAGEAL: CPT | Mod: 26

## 2023-01-24 RX ORDER — FUROSEMIDE 40 MG
20 TABLET ORAL ONCE
Refills: 0 | Status: DISCONTINUED | OUTPATIENT
Start: 2023-01-24 | End: 2023-01-26

## 2023-01-24 RX ORDER — IPRATROPIUM/ALBUTEROL SULFATE 18-103MCG
3 AEROSOL WITH ADAPTER (GRAM) INHALATION ONCE
Refills: 0 | Status: COMPLETED | OUTPATIENT
Start: 2023-01-24 | End: 2023-01-24

## 2023-01-24 RX ADMIN — TAMSULOSIN HYDROCHLORIDE 0.4 MILLIGRAM(S): 0.4 CAPSULE ORAL at 22:12

## 2023-01-24 RX ADMIN — Medication 3 MILLILITER(S): at 14:00

## 2023-01-24 RX ADMIN — HEPARIN SODIUM 5000 UNIT(S): 5000 INJECTION INTRAVENOUS; SUBCUTANEOUS at 19:09

## 2023-01-24 NOTE — PROGRESS NOTE ADULT - SUBJECTIVE AND OBJECTIVE BOX
Department of Cardiology                                               [facility]/[place] [address] [id number]                                        Telephone: [phone]. Fax:[phone]                                                     CARIOLOGY PRE VARSHA NOTE     77y Male here for a VARSHA.     Indication:   Evaluate severity of MR found on echocardiogram 1/19/23    HPI:  78 yo male with pmhx reportedly only of BPH presenting to the ED in acute hypoxic respiratory failure on NRB by EMS. Patient reports SOB started suddenly tonight, prompting him to call EMS. He denies any shortness of breath in the recent week. Only associated symptom is progressive LE edema over the past 4 days. He had never experience SOB like this, nor has he ever had LE edema in the past. He follows with [facility], states last appointment was about 2 months ago. He has been told he has kidney disease in the past, however, he does not know his baseline Cr and has never been referred to a nephrologist. He has also never been seen by a cardiologist in the past. He is feeling improved since being in the ED, now on NC. He denies chest pain, abdominal pain, dizziness, F/C, N/V/D. (18 Jan 2023 23:59)      Echo:   < from: TTE Echo Complete w/ Contrast w/ Doppler (01.19.23 @ 10:16) >  PHYSICIAN INTERPRETATION:  Left Ventricle: Endocardial visualization was enhanced with intravenous   echo contrast. The left ventricular internal cavity size is normal. Left   ventricular wall thickness is normal.  Global LV systolic function was mildly decreased. Left ventricular   ejection fraction, by visual estimation, is 45 to 50%. Spectral Doppler   shows pseudonormal pattern of left ventricular myocardial filling (Grade   II diastolic dysfunction). Elevated mean left atrial pressure.  Right Ventricle: Normal right ventricular size and function. The right   ventricular size is normal. RV systolic function is normal.  Left Atrium: Moderately enlarged left atrium.  Right Atrium: Normal right atrial size.  Pericardium: Trivial pericardial effusion is present. The pericardial   effusion is localized near the left ventricle. There is a small pleural   effusion in both left and right lateral regions.  Mitral Valve: Mild thickening of the anterior and posterior mitral valve   leaflets. There is mild mitral annular calcification. Severe mitral valve   regurgitation is seen.  Tricuspid Valve: The tricuspid valve is normal in structure. Mild   tricuspid regurgitation is visualized. Estimated pulmonary artery   systolic pressure is 45.5 mmHg assuming a right atrial pressure of 8   mmHg, which is consistent with mild pulmonary hypertension.  Aortic Valve: The aortic valve is trileaflet. Sclerotic aortic valve with   normal opening. Trivial aortic valve regurgitation is seen.  Pulmonic Valve: The pulmonic valve is normal. Trace pulmonic valve   regurgitation.  Aorta: The aortic root is normal in size and structure. The ascending   aorta was not well visualized.  Pulmonary Artery: The pulmonary artery is of normal size and origin.  Venous: The inferior vena cava was normal sized, with respiratory size   variation less than 50%.      Summary:   1. Endocardial visualization was enhanced with intravenous echo contrast.   2. Moderately enlarged left atrium.   3. Left ventricular ejection fraction, by visual estimation, is 45 to   50%.   4. Grade II diastolic dysfunction. Elevated mean left atrial pressure.   5. Normal right atrial size.   6. Normal right ventricular size and function.   7. Severe mitral valve regurgitation. tethering and tenting of the   leaflets suggestive of functional or ischemic MR.   8. Mild tricuspid regurgitation.   9. Estimated pulmonary artery systolic pressure is 46 mmHg assuming   -mild pulmonary hypertension.  10. Trivial pericardial effusion.    MD Addison, RPVI Electronically signed on 1/19/2023 at 2:58:25 PM  < end of copied text >        Physical Exam:   General: Awake, alert, no acute distress  HEENT: NC, AT, airway patent.  Chest: CTA, RRR                          9.7    9.57  )-----------( 239      ( 23 Jan 2023 06:40 )             30.1     01-23    145  |  107  |  28.2<H>  ----------------------------<  82  3.5   |  27.0  |  2.61<H>    Ca    8.3<L>      23 Jan 2023 06:40  Mg     2.0     01-23        ASSESSMENT AND PLAN:    78 y/o male with hx of BPH, ?CKD, current smoker who presents with SOB and leg swelling. Pt states that he noticed his feet and ankles swelling up 4 days ago and he started having trouble breathing yesterday. Echocardiogram done on admission showed severe MR. He now presents for VARSHA to evaluate severity.      -pt has been NPO since MN  -labs, ekg ordered  -consent to be obtained by attending and anesthesia  -VARSHA order placed

## 2023-01-24 NOTE — PROGRESS NOTE ADULT - ASSESSMENT
Right kidney: 10.9 cm. Normal in size. Moderate increased echogenicity.   There is moderate right hydronephrosis. No focal lesions or stones are   identified.    Left kidney: 9.9 cm. Normal in size. Moderate increased echogenicity.   There is moderate right hydronephrosis. No focal lesions or stones are   identified.    Urinary bladder: Urinary bladder is collapsed about a Goyal catheter   limiting its evaluation.    Incidentally noted are mild bilateral pleural effusions.    IMPRESSION:  Moderate bilateral hydronephrosis.    Urinary bladder is collapsed around a Goyal catheter.              78 YO M w/ BPH presents with SOB. Admitted for acute hypoxic respiratory failure in setting of PNA complicated by b/l effusions, partially loculated on the left + decompensated heart failure. Also found to have b/l hydronephrosis. Hospital course is notable for R chest tube placement on 01/19 and Goyal placement on 01/19. TTE showed severe mitral valve regurg + mild pulmonary HTN + diastolic dysfunction. Nephrology is consulted for GERSON on ? CKD.      1. 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 D/w patient again and he mentioned lab work done around 3months ago w/ PCP and no mention of advanced CKD was made.   -SCr peaked at 3.4, now improving 2.7mg/dl today  -UA 15 protein, UPCR 0.3  -CT showed b/l hydronephrosis, Follow up renal ultrasound showed R kidney 10.9cm + L kidney 9.9cm + b/l hydronephrosis   -s/p goyal placement on 01/19/23 - non oliguric   -GERSON form chr PIMENTEL/ VUR--> expecting Scr to continue to improve from here    2.Blood pressure acceptable   3. Metabolic acidosis in setting of CKD - serum bicarb is WNL  4. Anemia - workup for iron deficiency,  5. 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 B/l hydronephrosis, + Goyal,

## 2023-01-24 NOTE — PROGRESS NOTE ADULT - SUBJECTIVE AND OBJECTIVE BOX
Progress Note Adult-Nephrology Attending [Charted Location: [facility] [id number]] [Authored: 2023 15:59]- for Visit: [id number], Complete, Entered, Signed in Full, Available to Patient    Progress Note:   · Provider Specialty	Nephrology    Reason for Admission:    Reason for Admission:  · Reason for Admission	Acute hypoxic respiratory failure 2/2 Bacterial PNA and new onset CHF      · Subjective and Objective:   Reason for visit:  GERSON    Subjective: No acute overnight event. Good UOP. No fever/chills. Seen earlier today.    ROS: All systems were reviewed in detail pertinent positive and negative mentioned above, rest are negative.    Physical Exam:  General: Thin elderly male in NAD  Cardiac: S1S2 RRR  Respiratory: CTAB  Abdomen: Soft, NT  Extremities: No appreciable edema  Neuro: AAOx3  goyal, chest tube    =======================================================  Vital Signs Last 24 Hrs,    T(C): 36.3 (2023 10:55), Max: 37.1 (2023 16:51)  T(F): 97.4 (2023 10:55), Max: 98.8 (2023 16:51)  HR: 81 (2023 10:55) (75 - 81)  BP: 113/60 (2023 10:55) (113/60 - 113/64)    RR: 18 (2023 10:55) (18 - 18)  SpO2: 95% (2023 10:55) (95% - 95%)    O2 Parameters below as of 2023 16:51  Patient On (Oxygen Delivery Method): room air    T(C): 37.1 (2023 16:51), Max: 37.1 (2023 16:51)  T(F): 98.8 (2023 16:51), Max: 98.8 (2023 16:51)  HR: 75 (2023 16:51) (74 - 75)  BP: 113/64 (2023 16:51) (113/64 - 127/65)  RR: 18 (2023 16:51) (18 - 18)  SpO2: 95% (2023 16:51) (92% - 95%)    Parameters below as of 2023 16:51  Patient On (Oxygen Delivery Method): room air      I&O's Summary    2023 07:01  -  2023 07:00  --------------------------------------------------------  IN: 0 mL / OUT: 1800 mL / NET: -1800 mL    2023 07:01  -  2023 20:34  --------------------------------------------------------  IN: 0 mL / OUT: 500 mL / NET: -500 mL    =======================================================  Current Antibiotics:    Other medications:  ergocalciferol 89688 Unit(s) Oral every week  heparin   Injectable 5000 Unit(s) SubCutaneous every 12 hours  influenza  Vaccine (HIGH DOSE) 0.7 milliLiter(s) IntraMuscular once  iron sucrose IVPB 200 milliGRAM(s) IV Intermittent every 24 hours  lidocaine 2% Jelly 5 milliLiter(s) IntraUrethral once  tamsulosin 0.4 milliGRAM(s) Oral at bedtime    145    |  107    |  28.2<H>  ----------------------------<  82     Ca:8.3<L> (2023 06:40)  3.5     |  27.0   |  2.61<H>    TPro  7.3    /  Alb  2.6<L>  /  TBili  0.3<L>  /  DBili  x      /  AST  16     /  ALT  8      /  AlkPhos  130<H>  2023 05:00                        9.7<L>  9.57  )-----------( 239      ( 2023 06:40 )             30.1<L>    Phos:-- M.0 mg/dL PTH:-- Uric acid:-- Serum Osm:--  Ferritin:-- Iron:-- TIBC:-- Tsat:--  B12:-- TSH:-- ( @ 06:40)    Urinalysis Basic - ( 2023 06:30 )  Color: Yellow / Appearance: Clear / S.005<L> / pH: x  Gluc: x / Ketone: Negative  / Bili: Negative / Urobili: Negative mg/dL   Blood: x / Protein: 15<!> / Nitrite: Negative   Leuk Esterase: Trace<!> / RBC: 6-10 /HPF<!> / WBC 0-2 /HPF   Sq Epi: x / Non Sq Epi: Occasional / Bacteria: Occasional<!>      UProt:-- UCr:54 mg/dL P/C Ratio:0.3 Ratio<H> 24 hour Prot:-- UVol:-- CrCl:--  Arron:68 mmol/L UOsm:244 mosm/kg<L> UVol:-- UCl:-- UK:16 mmol/L ( @ 09:30)      =======================================================      144  |  105  |  27.4<H>  ----------------------------<  86  3.5   |  26.0  |  2.72<H>    Ca    8.3<L>      2023 05:00  Phos  3.4       Mg     2.0         TPro  7.3  /  Alb  2.6<L>  /  TBili  0.3<L>  /  DBili  x   /  AST  16  /  ALT  8   /  AlkPhos  130<H>      Creatinine, Serum: 2.72 mg/dL (23 @ 05:00)  Creatinine, Serum: 3.39 mg/dL (23 @ 05:00)  Creatinine, Serum: 3.44 mg/dL (23 @ 02:55)  Creatinine, Serum: 3.09 mg/dL (23 @ 03:59)  Creatinine, Serum: 3.05 mg/dL (23 @ 20:20)    Urinalysis Basic - ( 2023 06:30 )    Color: Yellow / Appearance: Clear / S.005 / pH: x  Gluc: x / Ketone: Negative  / Bili: Negative / Urobili: Negative mg/dL   Blood: x / Protein: 15 / Nitrite: Negative   Leuk Esterase: Trace / RBC: 6-10 /HPF / WBC 0-2 /HPF   Sq Epi: x / Non Sq Epi: Occasional / Bacteria: Occasional    =======================================================    < from: Xray Chest 1 View- PORTABLE-Urgent (Xray Chest 1 View- PORTABLE-Urgent .) (23 @ 05:39) >    ACC: [id number] EXAM:  XR CHEST PORTABLE URGENT 1V   ORDERED BY: MASSIMO PALOMINO     PROCEDURE DATE:  2023          INTERPRETATION:  INDICATION: Right-sided pigtail catheter insertion.    Single view of the chest was performed.    COMPARISON: 2023    FINDINGS:  Lines: There is a right-sided basilar pigtail chest tube in place. There   is no pneumothorax. Findings are unchanged.  Heart/Vascular: Within normal limits  Pulmonary: Unchanged small bilateral pleural effusions. Scarring of   mineralization is seen at the lung apices bilaterally, unchanged.  Bones: Dextroscoliosis    Impression:  Right-sided chest tube in place. No pneumothorax. Unchanged small   bilateral pleural effusions.    US Renal (23 @ 10:23)     ACC: [id number] EXAM:  US KIDNEY(S)   ORDERED BY: MACKENZIE LOPEZ     PROCEDURE DATE:  2023      INTERPRETATION:  CLINICAL INFORMATION: Acute renal insufficiency.    COMPARISON: Chest CT dated 2023    TECHNIQUE: Sonography of the kidneys and bladder.    FINDINGS:  Right kidney: 10.9 cm. Normal in size. Moderate increased echogenicity.   There is moderate right hydronephrosis. No focal lesions or stones are   identified.    Left kidney: 9.9 cm. Normal in size. Moderate increased echogenicity.   There is moderate right hydronephrosis. No focal lesions or stones are   identified.    Urinary bladder: Urinary bladder is collapsed about a Goyal catheter   limiting its evaluation.    Incidentally noted are mild bilateral pleural effusions.    IMPRESSION:  Moderate bilateral hydronephrosis.    Urinary bladder is collapsed around a Goyal catheter.    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

## 2023-01-24 NOTE — PROGRESS NOTE ADULT - SUBJECTIVE AND OBJECTIVE BOX
Chief Complaint:  resp failure    SUBJECTIVE / OVERNIGHT EVENTS:  no acute events reported overnight.  Pt denies sob, cp, palpitatioins, abd pain.          PHYSICAL EXAM:  Vital Signs Last 24 Hrs  T(C): 36.4 (2023 16:14), Max: 36.8 (2023 04:44)  T(F): 97.5 (2023 16:14), Max: 98.2 (2023 04:44)  HR: 84 (2023 16:14) (71 - 85)  BP: 115/57 (2023 16:14) (78/56 - 150/67)  BP(mean): --  RR: 16 (2023 16:14) (16 - 25)  SpO2: 98% (2023 16:14) (95% - 99%)    Parameters below as of 2023 16:14  Patient On (Oxygen Delivery Method): nasal cannula  O2 Flow (L/min): 2      GENERAL: pt examined bedside, laying comfortably in bed in NAD  HEENT: NC/AT, moist oral mucosa, clear conjunctiva, sclera nonicteric  RESPIRATORY: Normal respiratory effort, no wheezing, rhonchi, rales  CARDIOVASCULAR: RRR, normal S1 and S2  ABDOMEN: soft, NT/ND, +BS, no rebound/guarding  EXTREMITIES: No cynaosis, no clubbing, no lower extremity edema, pulses are 2+ bilaterally  NEUROLOGY: A+O to person, place, and time, no focal neurologic deficits appreciated   SKIN: No rashes or no palpable lesions        LABS:                                                           9.7    9.57  )-----------( 239      ( 2023 06:40 )             30.1         145  |  107  |  28.2<H>  ----------------------------<  82  3.5   |  27.0  |  2.61<H>    Ca    8.3<L>      2023 06:40  Mg     2.0             Urinalysis Basic - ( 2023 06:30 )    Color: Yellow / Appearance: Clear / S.005 / pH: x  Gluc: x / Ketone: Negative  / Bili: Negative / Urobili: Negative mg/dL   Blood: x / Protein: 15 / Nitrite: Negative   Leuk Esterase: Trace / RBC: 6-10 /HPF / WBC 0-2 /HPF   Sq Epi: x / Non Sq Epi: Occasional / Bacteria: Occasional        Culture - Urine (collected 2023 09:30)  Source: Clean Catch Clean Catch (Midstream)  Final Report (2023 16:57):    <10,000 CFU/mL Normal Urogenital Maryam    Culture - Fungal, Body Fluid (collected 2023 06:15)  Source: Pleural Fl Pleural Fluid  Preliminary Report (2023 15:03):    Culture is being performed. Fungal cultures are held for 4 weeks.    Culture - Body Fluid with Gram Stain (collected 2023 06:15)  Source: Pleural Fl Pleural Fluid  Gram Stain (2023 20:10):    polymorphonuclear leukocytes seen    No organisms seen    by cytocentrifuge  Preliminary Report (2023 11:48):    No growth    Culture - Blood (collected 2023 20:20)  Source: .Blood Blood-Peripheral  Preliminary Report (2023 02:02):    No growth to date.    Culture - Blood (collected 2023 20:15)  Source: .Blood Blood-Peripheral  Preliminary Report (2023 02:02):    No growth to date.      CAPILLARY BLOOD GLUCOSE            RADIOLOGY & ADDITIONAL TESTS:    < from: US Duplex Venous Lower Ext Ltd, Left (23 @ 10:24) >  IMPRESSION:  No evidence of left lower extremity deep venous thrombosis.    < end of copied text >      < from: US Renal (23 @ 10:23) >  IMPRESSION:  Moderate bilateral hydronephrosis.    Urinary bladder is collapsed around a Ulrich catheter.    < end of copied text >      < from: Xray Chest 1 View- PORTABLE-Urgent (Xray Chest 1 View- PORTABLE-Urgent .) (23 @ 06:54) >  IMPRESSION: No pneumothorax.    Decreased LEFT effusion with residual LEFT retrocardiac airspace   consolidation/atelectasis.  Residual RIGHT lower zone small effusion and/or RIGHT lower lobe   reexpansion pneumonitis.  RIGHT multi-sidehole pigtail catheter overlies RIGHT lower hemithorax..    < end of copied text >      < from: CT Chest No Cont (23 @ 00:27) >  IMPRESSION:  Moderate volume bilateral pleural effusions, partially loculated on the   left. Interlobular septal thickening representing interstitial pulmonary   edema/CHF.  Small volume perihepatic and perisplenic ascites.  Incompletely visualized mild symmetric bilateral hydronephrosis.    < end of copied text >      < from: TTE Echo Complete w/ Contrast w/ Doppler (23 @ 10:16) >  Summary:   1. Endocardial visualization was enhanced with intravenous echo contrast.   2. Moderately enlarged left atrium.   3. Left ventricular ejection fraction, by visual estimation, is 45 to   50%.   4. Grade II diastolic dysfunction. Elevated mean left atrial pressure.   5. Normal right atrial size.   6. Normal right ventricular size and function.   7. Severe mitral valve regurgitation. tethering and tenting of the   leaflets suggestive of functional or ischemic MR.   8. Mild tricuspid regurgitation.   9. Estimated pulmonary artery systolic pressure is 46 mmHg assuming   -mild pulmonary hypertension.  10. Trivial pericardial effusion.    < end of copied text >      < from: Xray Chest 1 View- PORTABLE-Urgent (Xray Chest 1 View- PORTABLE-Urgent .) (23 @ 14:11) >  Mild COPD hyperexpansion of the lungs and bilateral apical thickening   with calcification on the right again noted.    Some degree of atelectasis of the left lower lobe is again noted but   shows better aeration than on .    Slight blunting the right base increased.    < end of copied text >      VARSHA 2023  - showed moderate MR and small PFO        MEDICATIONS  (STANDING):  ergocalciferol 58019 Unit(s) Oral every week  heparin   Injectable 5000 Unit(s) SubCutaneous every 12 hours  influenza  Vaccine (HIGH DOSE) 0.7 milliLiter(s) IntraMuscular once  iron sucrose IVPB 200 milliGRAM(s) IV Intermittent every 24 hours  lidocaine 2% Jelly 5 milliLiter(s) IntraUrethral once  tamsulosin 0.4 milliGRAM(s) Oral at bedtime    MEDICATIONS  (PRN):  acetaminophen     Tablet .. 650 milliGRAM(s) Oral every 6 hours PRN Temp greater or equal to 38C (100.4F), Mild Pain (1 - 3)  melatonin 3 milliGRAM(s) Oral at bedtime PRN Insomnia  ondansetron Injectable 4 milliGRAM(s) IV Push every 8 hours PRN Nausea and/or Vomiting

## 2023-01-24 NOTE — CHART NOTE - NSCHARTNOTEFT_GEN_A_CORE
EMS. Patient reports SOB started suddenly tonight, prompting him to call EMS. He denies any shortness of breath in the recent week. Only associated symptom is progressive LE edema over the past 4 days. He had never experience SOB like this, nor has he ever had LE edema in the past. He follows with [facility], states last appointment was about 2 months ago. He has been told he has kidney disease in the past, however, he does not know his baseline Cr and has never been referred to a nephrologist. He has also never been seen by a cardiologist in the past. He is feeling improved since being in the ED, now on NC. He denies chest pain, abdominal pain, dizziness, F/C, N/V/D. (18 Jan 2023 23:59)    ECHO:   Summary:   1. Endocardial visualization was enhanced with intravenous echo contrast.   2. Moderately enlarged left atrium.   3. Left ventricular ejection fraction, by visual estimation, is 45 to   50%.   4. Grade II diastolic dysfunction. Elevated mean left atrial pressure.   5. Normal right atrial size.   6. Normal right ventricular size and function.   7. Severe mitral valve regurgitation. tethering and tenting of the   leaflets suggestive of functional or ischemic MR.   8. Mild tricuspid regurgitation.   9. Estimated pulmonary artery systolic pressure is 46 mmHg assuming   -mild pulmonary hypertension.  10. Trivial pericardial effusion.    MD Addison, RPVI Electronically signed on 1/19/2023 at 2:58:25 PM  < end of copied text >        Patient now s/p VARSHA which showed moderate MR and small PFO  Tolerated procedure well       - post VARSHA orders  - NPO for 2 hours  - assess ability to swallow prior to return to floor  - found w/ small PFO and moderate MR  - cards follow up appreciated   - found with b/l hydronephrosis; renal following   - rest of care per medicine EMS. Patient reports SOB started suddenly tonight, prompting him to call EMS. He denies any shortness of breath in the recent week. Only associated symptom is progressive LE edema over the past 4 days. He had never experience SOB like this, nor has he ever had LE edema in the past. He follows with [facility], states last appointment was about 2 months ago. He has been told he has kidney disease in the past, however, he does not know his baseline Cr and has never been referred to a nephrologist. He has also never been seen by a cardiologist in the past. He is feeling improved since being in the ED, now on NC. He denies chest pain, abdominal pain, dizziness, F/C, N/V/D. (18 Jan 2023 23:59)    ECHO:   Summary:   1. Endocardial visualization was enhanced with intravenous echo contrast.   2. Moderately enlarged left atrium.   3. Left ventricular ejection fraction, by visual estimation, is 45 to   50%.   4. Grade II diastolic dysfunction. Elevated mean left atrial pressure.   5. Normal right atrial size.   6. Normal right ventricular size and function.   7. Severe mitral valve regurgitation. tethering and tenting of the   leaflets suggestive of functional or ischemic MR.   8. Mild tricuspid regurgitation.   9. Estimated pulmonary artery systolic pressure is 46 mmHg assuming   -mild pulmonary hypertension.  10. Trivial pericardial effusion.    MD Addison, RPVI Electronically signed on 1/19/2023 at 2:58:25 PM  < end of copied text >        Patient now s/p VARSHA which showed moderate MR and small PFO  Tolerated procedure well       - post VARSHA orders  - NPO for 2 hours  - assess ability to swallow prior to return to floor  - found w/ small PFO and moderate MR  - post VARSHA pt hypotensive, now s/p  ml x1 bolus  - also with sob, diminished breath sounds L>R  - CXR in cath holding with increased hazyness, ? increase in effusions since prev CXR 1/21 post pigtail removal  - c/o SOB, s/p Duoneb tx x1, placed on O2 2L NC   - cards follow up appreciated   - found with b/l hydronephrosis; renal following   - rest of care per medicine  - above discussed with Dr. Melo EMS. Patient reports SOB started suddenly tonight, prompting him to call EMS. He denies any shortness of breath in the recent week. Only associated symptom is progressive LE edema over the past 4 days. He had never experience SOB like this, nor has he ever had LE edema in the past. He follows with [facility], states last appointment was about 2 months ago. He has been told he has kidney disease in the past, however, he does not know his baseline Cr and has never been referred to a nephrologist. He has also never been seen by a cardiologist in the past. He is feeling improved since being in the ED, now on NC. He denies chest pain, abdominal pain, dizziness, F/C, N/V/D. (18 Jan 2023 23:59)    ECHO:   Summary:   1. Endocardial visualization was enhanced with intravenous echo contrast.   2. Moderately enlarged left atrium.   3. Left ventricular ejection fraction, by visual estimation, is 45 to   50%.   4. Grade II diastolic dysfunction. Elevated mean left atrial pressure.   5. Normal right atrial size.   6. Normal right ventricular size and function.   7. Severe mitral valve regurgitation. tethering and tenting of the   leaflets suggestive of functional or ischemic MR.   8. Mild tricuspid regurgitation.   9. Estimated pulmonary artery systolic pressure is 46 mmHg assuming   -mild pulmonary hypertension.  10. Trivial pericardial effusion.    MD Addison, RPVI Electronically signed on 1/19/2023 at 2:58:25 PM  < end of copied text >        Patient now s/p VARSHA which showed moderate MR and small PFO  Tolerated procedure well       - post VARSHA orders  - NPO for 2 hours  - assess ability to swallow prior to return to floor  - found w/ small PFO and moderate MR  - post VARSHA pt hypotensive, now s/p  ml x1 bolus  - also with sob, diminished breath sounds L>R  - CXR in cath holding with increased hazyness, ? increase in effusions since prev CXR 1/21 post pigtail removal  - c/o SOB, s/p Duoneb tx x1, placed on O2 2L NC   - lasix 20 mg IVP x1 in cath holding (), if tolerates can give additional dose w/SBP > 110  - cards follow up appreciated   - found with b/l hydronephrosis; renal following   - rest of care per medicine  - above discussed with Dr. Melo

## 2023-01-24 NOTE — PROGRESS NOTE ADULT - PROBLEM SELECTOR PLAN 1
- Echo showed 45-50%, DDII,   - severe MR BNP 6457  - BNP 6457, repeat today   - pt w/ CKD, peak creatinine was 3.4, has hydronephrosis on renal ultrasound   - can continue diuresis for now, symptoms possibly in setting of severe MR   - stress test normal   - will also need eval of severe MR, VARSHA today

## 2023-01-25 LAB
ANION GAP SERPL CALC-SCNC: 11 MMOL/L — SIGNIFICANT CHANGE UP (ref 5–17)
BUN SERPL-MCNC: 24.1 MG/DL — HIGH (ref 8–20)
CALCIUM SERPL-MCNC: 8.6 MG/DL — SIGNIFICANT CHANGE UP (ref 8.4–10.5)
CHLORIDE SERPL-SCNC: 101 MMOL/L — SIGNIFICANT CHANGE UP (ref 96–108)
CO2 SERPL-SCNC: 27 MMOL/L — SIGNIFICANT CHANGE UP (ref 22–29)
CREAT SERPL-MCNC: 2.26 MG/DL — HIGH (ref 0.5–1.3)
EGFR: 29 ML/MIN/1.73M2 — LOW
GLUCOSE SERPL-MCNC: 103 MG/DL — HIGH (ref 70–99)
HCT VFR BLD CALC: 30.7 % — LOW (ref 39–50)
HGB BLD-MCNC: 10.2 G/DL — LOW (ref 13–17)
MAGNESIUM SERPL-MCNC: 2 MG/DL — SIGNIFICANT CHANGE UP (ref 1.6–2.6)
MCHC RBC-ENTMCNC: 28.6 PG — SIGNIFICANT CHANGE UP (ref 27–34)
MCHC RBC-ENTMCNC: 33.2 GM/DL — SIGNIFICANT CHANGE UP (ref 32–36)
MCV RBC AUTO: 86 FL — SIGNIFICANT CHANGE UP (ref 80–100)
NT-PROBNP SERPL-SCNC: 2323 PG/ML — HIGH (ref 0–300)
PLATELET # BLD AUTO: 240 K/UL — SIGNIFICANT CHANGE UP (ref 150–400)
POTASSIUM SERPL-MCNC: 3.7 MMOL/L — SIGNIFICANT CHANGE UP (ref 3.5–5.3)
POTASSIUM SERPL-SCNC: 3.7 MMOL/L — SIGNIFICANT CHANGE UP (ref 3.5–5.3)
RBC # BLD: 3.57 M/UL — LOW (ref 4.2–5.8)
RBC # FLD: 16.1 % — HIGH (ref 10.3–14.5)
SODIUM SERPL-SCNC: 139 MMOL/L — SIGNIFICANT CHANGE UP (ref 135–145)
WBC # BLD: 11.37 K/UL — HIGH (ref 3.8–10.5)
WBC # FLD AUTO: 11.37 K/UL — HIGH (ref 3.8–10.5)

## 2023-01-25 PROCEDURE — 99232 SBSQ HOSP IP/OBS MODERATE 35: CPT

## 2023-01-25 PROCEDURE — 99234 HOSP IP/OBS SM DT SF/LOW 45: CPT

## 2023-01-25 RX ORDER — METOPROLOL TARTRATE 50 MG
25 TABLET ORAL DAILY
Refills: 0 | Status: DISCONTINUED | OUTPATIENT
Start: 2023-01-25 | End: 2023-01-26

## 2023-01-25 RX ORDER — FUROSEMIDE 40 MG
1 TABLET ORAL
Qty: 60 | Refills: 0
Start: 2023-01-25 | End: 2023-03-25

## 2023-01-25 RX ORDER — FUROSEMIDE 40 MG
20 TABLET ORAL DAILY
Refills: 0 | Status: DISCONTINUED | OUTPATIENT
Start: 2023-01-25 | End: 2023-01-26

## 2023-01-25 RX ORDER — METOPROLOL TARTRATE 50 MG
1 TABLET ORAL
Qty: 60 | Refills: 0
Start: 2023-01-25 | End: 2023-03-25

## 2023-01-25 RX ADMIN — TAMSULOSIN HYDROCHLORIDE 0.4 MILLIGRAM(S): 0.4 CAPSULE ORAL at 22:46

## 2023-01-25 RX ADMIN — Medication 25 MILLIGRAM(S): at 12:54

## 2023-01-25 RX ADMIN — Medication 20 MILLIGRAM(S): at 17:16

## 2023-01-25 RX ADMIN — HEPARIN SODIUM 5000 UNIT(S): 5000 INJECTION INTRAVENOUS; SUBCUTANEOUS at 17:16

## 2023-01-25 RX ADMIN — HEPARIN SODIUM 5000 UNIT(S): 5000 INJECTION INTRAVENOUS; SUBCUTANEOUS at 05:31

## 2023-01-25 NOTE — PROGRESS NOTE ADULT - NS ATTEND AMEND GEN_ALL_CORE FT
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 May be underestimated   -Will plan to reassess with TTE as outpatient once on GDMT   -Start beta blocker tomorrow, followed by hydralazine/imdur if BP remains stable (hypotensive during VARSHA). Avoid ACE/ARB/ARNI, MRA, and SGLT2i due to renal insufficiency  -Patient has increased work of breathing following procedure, repeat CXR showed blunting at bases. Administered lasix 20mg IV x 1 with some improvement   -Repeat labs to monitor renal function
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 May be underestimated   -Will plan to reassess with TTE as outpatient once on GDMT   -Tolerating beta blocker. Add hydralazine/imdur if BP remains stable (hypotensive during VARSHA). Avoid ACE/ARB/ARNI, MRA, and SGLT2i due to renal insufficiency  -Respiratory status significantly improved   -Renal function stable  -Dc on lasix 20mg po daily     No further inpatient cardiac workup needed at this time  Please reconsult with any further questions     Plan discussed with Dr. Segura
Patient was seen and examined at bedside and notes that he has worsening Cr   s/ Lasix 40mg IVP x 3 with follow up with Nephrology   There is worsening Cr after Lasix and currently on Hold   Echo showed 45-50%, DDII, severe MR BNP 6457, Net neg 5330 in 24 hours  -SOB improved.  Will need ischemic eval, NM Myocardial if not a candidate for cath based on Cr.     Plan NM myocardial in AM   Dina Brown D.O. formerly [facility]  Cardiology/Vascular Cardiology -[facility]   Telephone # [phone].
Patient was seen and examined at bedside and notes that he has worsening Cr   s/ Lasix 40mg IVP x 3 with follow up with Nephrology   There is worsening Cr after Lasix and currently on Hold   Echo showed 45-50%, DDII, severe MR BNP 6457, Net neg 5330 in 24 hours  -SOB improved.  Will need ischemic eval once Cr improves or even consider NM Myocardial if not a candidate for cath based on Cr.     Dina Brown D.O. [facility]  Cardiology/Vascular Cardiology -[facility]   Telephone # [phone]

## 2023-01-25 NOTE — DIETITIAN INITIAL EVALUATION ADULT - ADD RECOMMEND
Add Ensure TID; Rx MVI daily; Encourage HBV protein sources; Obtain/provide food preferences daily to inc PO

## 2023-01-25 NOTE — PROGRESS NOTE ADULT - ASSESSMENT
78 YO M w/ BPH presents with SOB. Admitted for acute hypoxic respiratory failure in setting of PNA complicated by b/l effusions, partially loculated on the left + decompensated heart failure. Also found to have b/l hydronephrosis. Hospital course is notable for R chest tube placement on 01/19 and Goyal placement on 01/19. TTE showed severe mitral valve regurg + mild pulmonary HTN + diastolic dysfunction. Nephrology is consulted for GERSON on ? CKD.      1. 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 D/w patient again and he mentioned lab work done around 3months ago w/ PCP and no mention of advanced CKD was made.   -SCr peaked at 3.4, now improving 2.2mg/dl today (? his baseline)  -UA 15 protein, UPCR 0.3  -CT showed b/l hydronephrosis, Follow up renal ultrasound showed R kidney 10.9cm + L kidney 9.9cm + b/l hydronephrosis   -s/p goyal placement on 01/19/23 - non oliguric   -GERSON form chr PIMENTEL/ VUR--> expecting Scr to continue to improve from here  -Will follow in outpatient nephrology clinic      2.Blood pressure acceptable   3. Metabolic acidosis in setting of CKD - serum bicarb is WNL  4. Anemia - workup for iron def  5. 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 B/l hydronephrosis - s/p goyal - management of goyal as per Urology       Nephrology will sign off, thanks for the consult.

## 2023-01-25 NOTE — PROGRESS NOTE ADULT - SUBJECTIVE AND OBJECTIVE BOX
Reason for visit:  GERSON    Subjective: No acute overnight event. Good UOP. No fever/chills. Seen earlier today.    ROS: All systems were reviewed in detail pertinent positive and negative mentioned above, rest are negative.    Physical Exam:  General: Thin elderly male in NAD  Cardiac: S1S2 RRR  Respiratory: CTAB  Abdomen: Soft, NT  Extremities: No appreciable edema  Neuro: AAOx3  goyal, chest tube    =======================================================  Vital Signs Last 24 Hrs  T(C): 36.6 (25 Jan 2023 17:17), Max: 36.7 (25 Jan 2023 04:26)  T(F): 97.8 (25 Jan 2023 17:17), Max: 98 (25 Jan 2023 04:26)  HR: 87 (25 Jan 2023 17:17) (83 - 87)  BP: 104/56 (25 Jan 2023 17:17) (104/56 - 106/56)  BP(mean): --  RR: 18 (25 Jan 2023 17:17) (18 - 18)  SpO2: 97% (25 Jan 2023 17:17) (95% - 97%)      I&O's Summary    =======================================================  Current Antibiotics:    Other medications:  ergocalciferol 90933 Unit(s) Oral every week  furosemide    Tablet 20 milliGRAM(s) Oral daily  furosemide   Injectable 20 milliGRAM(s) IV Push once  heparin   Injectable 5000 Unit(s) SubCutaneous every 12 hours  lidocaine 2% Jelly 5 milliLiter(s) IntraUrethral once  metoprolol succinate ER 25 milliGRAM(s) Oral daily  tamsulosin 0.4 milliGRAM(s) Oral at bedtime    =======================================================  01-25    139  |  101  |  24.1<H>  ----------------------------<  103<H>  3.7   |  27.0  |  2.26<H>    Ca    8.6      25 Jan 2023 05:30  Mg     2.0     01-25      Creatinine, Serum: 2.26 mg/dL (01-25-23 @ 05:30)  Creatinine, Serum: 2.61 mg/dL (01-23-23 @ 06:40)  Creatinine, Serum: 2.72 mg/dL (01-22-23 @ 05:00)  Creatinine, Serum: 3.39 mg/dL (01-21-23 @ 05:00)      =======================================================

## 2023-01-25 NOTE — DIETITIAN INITIAL EVALUATION ADULT - NS FNS DIET ORDER
Diet, Renal Restrictions:   For patients receiving Renal Replacement - No Protein Restr, No Conc K, No Conc Phos, Low Sodium  1000mL Fluid Restriction ([id number]) (01-19-23 @ 18:23)

## 2023-01-25 NOTE — DIETITIAN NUTRITION RISK NOTIFICATION - PHYSICAL ASSESSMENT DORSAL HAND
VSS. Denies c/o pain or discomfort. Pt education reviewed with patient and family. Passing flatus.
severe

## 2023-01-25 NOTE — PROGRESS NOTE ADULT - PROBLEM SELECTOR PLAN 1
- Echo showed 45-50%, DDII, severe MR  -VARSHA with mod MR but was hypotensive  -BNP 6457 -> 2323  -GDMT: toprol

## 2023-01-25 NOTE — DIETITIAN INITIAL EVALUATION ADULT - PERTINENT MEDS FT
MEDICATIONS  (STANDING):  ergocalciferol 40129 Unit(s) Oral every week  furosemide   Injectable 20 milliGRAM(s) IV Push once  heparin   Injectable 5000 Unit(s) SubCutaneous every 12 hours  lidocaine 2% Jelly 5 milliLiter(s) IntraUrethral once  metoprolol succinate ER 25 milliGRAM(s) Oral daily  tamsulosin 0.4 milliGRAM(s) Oral at bedtime    MEDICATIONS  (PRN):  acetaminophen     Tablet .. 650 milliGRAM(s) Oral every 6 hours PRN Temp greater or equal to 38C (100.4F), Mild Pain (1 - 3)  melatonin 3 milliGRAM(s) Oral at bedtime PRN Insomnia  ondansetron Injectable 4 milliGRAM(s) IV Push every 8 hours PRN Nausea and/or Vomiting

## 2023-01-25 NOTE — DIETITIAN INITIAL EVALUATION ADULT - ETIOLOGY
related to inability to meet sufficient protein-energy needs in setting of GERSON, pleural effusion, advanced age

## 2023-01-25 NOTE — DIETITIAN INITIAL EVALUATION ADULT - PERTINENT LABORATORY DATA
01-25    139  |  101  |  24.1<H>  ----------------------------<  103<H>  3.7   |  27.0  |  2.26<H>    Ca    8.6      25 Jan 2023 05:30  Mg     2.0     01-25    A1C with Estimated Average Glucose Result: 5.1 % (01-19-23 @ 03:59)

## 2023-01-25 NOTE — PROGRESS NOTE ADULT - SUBJECTIVE AND OBJECTIVE BOX
[facility]                                                         CARDIOLOGY AT [facility]                                                                  [address], [place] [address]                                                         Telephone: [phone]. Fax:[phone]                                                                             PROGRESS NOTE    Reason for follow up: HFr EF  Update:       Review of symptoms:   Cardiac:  No chest pain. No dyspnea. No palpitations.  Respiratory: no cough. No dyspnea  Gastrointestinal: No diarrhea. No abdominal pain. No bleeding.   Neuro: No focal neuro complaints.    Vitals:  T(C): 36.7 (01-25-23 @ 04:26), Max: 36.7 (01-25-23 @ 04:26)  HR: 83 (01-25-23 @ 04:26) (71 - 85)  BP: 106/56 (01-25-23 @ 04:26) (78/56 - 117/56)  RR: 18 (01-25-23 @ 04:26) (16 - 25)  SpO2: 95% (01-25-23 @ 04:26) (95% - 99%)  Wt(kg): --  I&O's Summary        PHYSICAL EXAM:  Appearance: Comfortable. No acute distress  HEENT:  Atraumatic. Normocephalic.  Normal oral mucosa  Neurologic: A & O x 3, no gross focal deficits.  Cardiovascular: RRR S1 S2, No murmur, no rubs/gallops. No JVD  Respiratory: Lungs clear to auscultation, unlabored   Gastrointestinal:  Soft, Non-tender, + BS  Lower Extremities: 2+ Peripheral Pulses, No clubbing, cyanosis, or edema  Psychiatry: Patient is calm. No agitation.   Skin: warm and dry.    CURRENT CARDIAC MEDICATIONS:  furosemide   Injectable 20 milliGRAM(s) IV Push once  metoprolol succinate ER 25 milliGRAM(s) Oral daily      CURRENT OTHER MEDICATIONS:  acetaminophen     Tablet .. 650 milliGRAM(s) Oral every 6 hours PRN Temp greater or equal to 38C (100.4F), Mild Pain (1 - 3)  melatonin 3 milliGRAM(s) Oral at bedtime PRN Insomnia  ondansetron Injectable 4 milliGRAM(s) IV Push every 8 hours PRN Nausea and/or Vomiting  ergocalciferol 70540 Unit(s) Oral every week  heparin   Injectable 5000 Unit(s) SubCutaneous every 12 hours  tamsulosin 0.4 milliGRAM(s) Oral at bedtime      LABS:	 	  ( 25 Jan 2023 05:30 )  Troponin T  X    ,  CPK  X    , CKMB  X    , BNP 2323<H>    , ( 20 Jan 2023 06:21 )  Troponin T  0.05 ,  CPK  X    , CKMB  X    , BNP X        , ( 19 Jan 2023 16:55 )  Troponin T  0.08<H>,  CPK  X    , CKMB  X    , BNP X                                  10.2   11.37 )-----------( 240      ( 25 Jan 2023 05:30 )             30.7     01-25    139  |  101  |  24.1<H>  ----------------------------<  103<H>  3.7   |  27.0  |  2.26<H>    Ca    8.6      25 Jan 2023 05:30  Mg     2.0     01-25        Lipid Profile: Date: 01-19 @ 03:59  Total cholesterol 140; Direct LDL: --; HDL: 42; Triglycerides:81    HgA1c:   TSH: Thyroid Stimulating Hormone, Serum: 1.22 uIU/mL      TELEMETRY: not on tele      DIAGNOSTIC TESTING:  [ ] Echocardiogram:   < from: VARSHA Echo Doppler (01.24.23 @ 12:43) >  PHYSICIAN INTERPRETATION:  Left Ventricle: The left ventricular internal cavity size is normal.  Global LV systolic function was mildly decreased. Left ventricular   ejection fraction, by visual estimation, is 40 to 45%.  Right Ventricle: Normal right ventricular size and function.  Left Atrium: Severely enlarged left atrium. No left atrial appendage   thrombus is seen.  Right Atrium: Mildly enlargedright atrium.  Pericardium: There is no evidence of pericardial effusion.  Mitral Valve: Mild thickening and calcification of the anterior mitral   valve leaflet. Prior to anaesthesia patient was given neosynephrine and   then he was given propofol, Paitent baseline SBP was around 120-130 mm   Hg. Afer Propofol SBP was in the 70's and it did not improve with   pharmacological intervention by the anaesthesiologist.  Mitral regurgitation assessment done with SBP around 70  Posteriorly directed Mitral regurgitation, Moderate MR with ERO 0.14 sq   cm.  Tricuspid Valve: The tricuspid valve is normal in structure. No tricuspid   regurgitation is visualized.  Aortic Valve: The aortic valve is trileaflet. No evidence of aortic   stenosis. Trivial aortic valve regurgitation is seen.  Shunts: Agitated saline contrast was given intravenously to evaluate for   intracardiac shunting. A small patent foramen ovale is detected, with   predominantly right to left shunting across the atrial septum.      < end of copied text >      [ ] Stress Test:    < from: Nuclear Stress Test-Pharmacologic (01.23.23 @ 07:48) >  NUCLEAR FINDINGS:  The left ventricle was normal in size. Normal myocardial  perfusion scan, with no evidence of infarction or  inducible ischemia.    < end of copied text >

## 2023-01-25 NOTE — DIETITIAN INITIAL EVALUATION ADULT - ORAL INTAKE PTA/DIET HISTORY
Pt interview with assistance from  ID #[id number]. Pt reports overall decreased appetite and weight loss x4-5mo. UBW ~155-165lbs. Current RD bedscale weight ~126lbs visually appears more accurate, height estimated 67". Pt denies following a diet at home, agreeable to trial supplements in house. Discussed importance of adequate protein-energy to maintain LBM and improve nutrition status. Aware GERSON, Phos and K+ WNL.

## 2023-01-25 NOTE — PROGRESS NOTE ADULT - PROBLEM SELECTOR PROBLEM 2
Bilateral pleural effusion
Bilateral pleural effusion
Elevated troponin

## 2023-01-25 NOTE — DIETITIAN INITIAL EVALUATION ADULT - OTHER INFO
78 yo male with pmhx reportedly only of BPH presenting to the ED in acute hypoxic respiratory failure on NRB by EMS. Patient reports SOB started suddenly tonight, prompting him to call EMS. He denies any shortness of breath in the recent week. Only associated symptom is progressive LE edema over the past 4 days. He had never experience SOB like this, nor has he ever had LE edema in the past. He follows with [facility] [place], states last appointment was about 2 months ago. He has been told he has kidney disease in the past, however, he does not know his baseline Cr and has never been referred to a nephrologist.   Pt s/p chest tube placement (1/19) with 600mL drained, CT removed 1/21.

## 2023-01-25 NOTE — PROGRESS NOTE ADULT - ASSESSMENT
78 y/o M w/ PMH of BPH was BIBA for sob.  Pt was found to be hypoxic in the field and placed on a NRB.  Found to have pleural effusion s/p chest tube and removed 01/21.  Pt unable to have cath due to renal fxn but had nuclear stress test which was negetive.  Pt found to have severe MR on TTE and underwent VARSHA which showed moderate MR and small PFO.       Acute Hypoxic Respiratory Failure multifactorial 2/2 pleural effusions 2/2 acute decompensated HFrEF   - ruled out for pneumonia  - off O2  - continue PO lasix    SIRS likely reactive 2/2 acute CHF exacerbation   - Leukocytosis resolved   - All cxs NGTD and procal 0.09  - Abx d/c'd per ID recs   - Pleural fluid transudative   - ID following and recs noted     GERSON on ?CKD / b/l hydro concerning for obstructive uropathy 2/2 BPH  - Baseline renal fxn unknown    - Cr remains elevated but trending down overall   - CT and Renal sono reporting mod b/l hydro   - s/p goyal placement 01/19   - c/w flomax and monitor I/O's   - As per pt he was told he was told his kidney function is impaired by PMD   - Urine studies reviewed   - Avoid nephrotoxic meds or renally dose if needed  - Nephro following and recs noted     Normocytic anemia suspect of chronic dx   - H/h stable   - Hemodynamically stable and no active bleeding   - Iron panel reviewed   - Monitor CBC and transfuse if Hb<8    VTE ppx: heparin sq    Dispo: Patient for discharge

## 2023-01-25 NOTE — PROGRESS NOTE ADULT - NUTRITIONAL ASSESSMENT
This patient has been assessed with a concern for Malnutrition and has been determined to have a diagnosis/diagnoses of Severe protein-calorie malnutrition and Underweight (BMI < 19).    This patient is being managed with:   Diet Renal Restrictions-  For patients receiving Renal Replacement - No Protein Restr No Conc K No Conc Phos Low Sodium  1000mL Fluid Restriction ([id number])  Entered: Jan 19 2023  6:23PM

## 2023-01-25 NOTE — DIETITIAN NUTRITION RISK NOTIFICATION - ADDITIONAL COMMENTS/DIETITIAN RECOMMENDATIONS
1) Liberalize to low sodium  2) Add Ensure TID  3) Rx MVI daily  4) Encourage HBV protein sources  5) Obtain/provide food preferences daily to inc PO   6) Monitor weights daily for trend/accuracy

## 2023-01-25 NOTE — PROGRESS NOTE ADULT - REASON FOR ADMISSION
Acute hypoxic respiratory failure 2/2 Bacterial PNA and new onset CHF

## 2023-01-25 NOTE — PROGRESS NOTE ADULT - SUBJECTIVE AND OBJECTIVE BOX
CC: Acute hypoxic respiratory failure 2/2 Bacterial PNA and new onset CHF (25 Jan 2023 19:33)    INTERVAL HPI/OVERNIGHT EVENTS:  no acute events overnight    Vital Signs Last 24 Hrs  T(C): 36.8 (25 Jan 2023 22:52), Max: 36.8 (25 Jan 2023 22:52)  T(F): 98.2 (25 Jan 2023 22:52), Max: 98.2 (25 Jan 2023 22:52)  HR: 74 (25 Jan 2023 22:52) (74 - 87)  BP: 100/45 (25 Jan 2023 22:52) (100/45 - 106/56)  BP(mean): 64 (25 Jan 2023 22:52) (64 - 64)  RR: 16 (25 Jan 2023 22:52) (16 - 18)  SpO2: 98% (25 Jan 2023 22:52) (95% - 98%)    PHYSICAL EXAM:  General: in no acute distress  Eyes: PERRLA, EOMI; conjunctiva and sclera clear  Head: Normocephalic; atraumatic  ENMT: No nasal discharge; airway clear  Neck: Supple; non tender; no masses  Respiratory: No wheezes, rales or rhonchi  Cardiovascular: Regular rate and rhythm. S1 and S2 Normal; No murmurs, gallops or rubs  Gastrointestinal: Soft non-tender non-distended; Normal bowel sounds  Genitourinary: No costovertebral angle tenderness  Extremities: Normal range of motion, No clubbing, cyanosis or edema  Vascular: Peripheral pulses palpable 2+ bilaterally  Neurological: Alert and oriented x4  Skin: Warm and dry. No acute rash  Psychiatric: Cooperative and appropriate    I&O's Detail               10.2   11.37 )-----------( 240      ( 25 Jan 2023 05:30 )             30.7     25 Jan 2023 05:30    139    |  101    |  24.1   ----------------------------<  103    3.7     |  27.0   |  2.26     Ca    8.6        25 Jan 2023 05:30  Mg     2.0       25 Jan 2023 05:30    CAPILLARY BLOOD GLUCOSE    MEDICATIONS  (STANDING):  ergocalciferol 95404 Unit(s) Oral every week  furosemide    Tablet 20 milliGRAM(s) Oral daily  furosemide   Injectable 20 milliGRAM(s) IV Push once  heparin   Injectable 5000 Unit(s) SubCutaneous every 12 hours  lidocaine 2% Jelly 5 milliLiter(s) IntraUrethral once  metoprolol succinate ER 25 milliGRAM(s) Oral daily  tamsulosin 0.4 milliGRAM(s) Oral at bedtime    MEDICATIONS  (PRN):  acetaminophen     Tablet .. 650 milliGRAM(s) Oral every 6 hours PRN Temp greater or equal to 38C (100.4F), Mild Pain (1 - 3)  melatonin 3 milliGRAM(s) Oral at bedtime PRN Insomnia  ondansetron Injectable 4 milliGRAM(s) IV Push every 8 hours PRN Nausea and/or Vomiting      RADIOLOGY & ADDITIONAL TESTS:

## 2023-01-25 NOTE — PROGRESS NOTE ADULT - PROVIDER SPECIALTY LIST ADULT
Cardiology
Cardiology
Hospitalist
Nephrology
Hospitalist
Hospitalist
Nephrology
Nephrology
Urology
Hospitalist
Hospitalist
Infectious Disease
Nephrology
Cardiology
Hospitalist
Hospitalist
Nephrology
Nephrology
Thoracic Surgery
Thoracic Surgery
Cardiology

## 2023-01-25 NOTE — PROGRESS NOTE ADULT - PROBLEM SELECTOR PROBLEM 1
Bilateral pleural effusion
Acute CHF
Bilateral pleural effusion
Acute CHF
Acute CHF

## 2023-01-25 NOTE — DIETITIAN NUTRITION RISK NOTIFICATION - TREATMENT: THE FOLLOWING DIET HAS BEEN RECOMMENDED
Diet, Renal Restrictions:   For patients receiving Renal Replacement - No Protein Restr, No Conc K, No Conc Phos, Low Sodium  1000mL Fluid Restriction ([id number]) (01-19-23 @ 18:23) [Active]

## 2023-01-26 ENCOUNTER — FORM ENCOUNTER (OUTPATIENT)
Age: 78
End: 2023-01-26

## 2023-01-26 VITALS
RESPIRATION RATE: 18 BRPM | SYSTOLIC BLOOD PRESSURE: 108 MMHG | HEART RATE: 78 BPM | TEMPERATURE: 98 F | DIASTOLIC BLOOD PRESSURE: 61 MMHG | OXYGEN SATURATION: 97 %

## 2023-01-26 PROCEDURE — 83970 ASSAY OF PARATHORMONE: CPT

## 2023-01-26 PROCEDURE — C8929: CPT

## 2023-01-26 PROCEDURE — 78452 HT MUSCLE IMAGE SPECT MULT: CPT

## 2023-01-26 PROCEDURE — 0225U NFCT DS DNA&RNA 21 SARSCOV2: CPT

## 2023-01-26 PROCEDURE — 88112 CYTOPATH CELL ENHANCE TECH: CPT

## 2023-01-26 PROCEDURE — 84156 ASSAY OF PROTEIN URINE: CPT

## 2023-01-26 PROCEDURE — 82728 ASSAY OF FERRITIN: CPT

## 2023-01-26 PROCEDURE — 83540 ASSAY OF IRON: CPT

## 2023-01-26 PROCEDURE — C1729: CPT

## 2023-01-26 PROCEDURE — 87075 CULTR BACTERIA EXCEPT BLOOD: CPT

## 2023-01-26 PROCEDURE — 87070 CULTURE OTHR SPECIMN AEROBIC: CPT

## 2023-01-26 PROCEDURE — 85027 COMPLETE CBC AUTOMATED: CPT

## 2023-01-26 PROCEDURE — 83550 IRON BINDING TEST: CPT

## 2023-01-26 PROCEDURE — U0005: CPT

## 2023-01-26 PROCEDURE — 87086 URINE CULTURE/COLONY COUNT: CPT

## 2023-01-26 PROCEDURE — 80048 BASIC METABOLIC PNL TOTAL CA: CPT

## 2023-01-26 PROCEDURE — A9500: CPT

## 2023-01-26 PROCEDURE — 85025 COMPLETE CBC W/AUTO DIFF WBC: CPT

## 2023-01-26 PROCEDURE — 84484 ASSAY OF TROPONIN QUANT: CPT

## 2023-01-26 PROCEDURE — G1004: CPT

## 2023-01-26 PROCEDURE — 82553 CREATINE MB FRACTION: CPT

## 2023-01-26 PROCEDURE — 83880 ASSAY OF NATRIURETIC PEPTIDE: CPT

## 2023-01-26 PROCEDURE — 84100 ASSAY OF PHOSPHORUS: CPT

## 2023-01-26 PROCEDURE — 84300 ASSAY OF URINE SODIUM: CPT

## 2023-01-26 PROCEDURE — 96375 TX/PRO/DX INJ NEW DRUG ADDON: CPT

## 2023-01-26 PROCEDURE — 88305 TISSUE EXAM BY PATHOLOGIST: CPT

## 2023-01-26 PROCEDURE — 82042 OTHER SOURCE ALBUMIN QUAN EA: CPT

## 2023-01-26 PROCEDURE — 83036 HEMOGLOBIN GLYCOSYLATED A1C: CPT

## 2023-01-26 PROCEDURE — 81001 URINALYSIS AUTO W/SCOPE: CPT

## 2023-01-26 PROCEDURE — 84466 ASSAY OF TRANSFERRIN: CPT

## 2023-01-26 PROCEDURE — 89051 BODY FLUID CELL COUNT: CPT

## 2023-01-26 PROCEDURE — 36415 COLL VENOUS BLD VENIPUNCTURE: CPT

## 2023-01-26 PROCEDURE — 82570 ASSAY OF URINE CREATININE: CPT

## 2023-01-26 PROCEDURE — 93005 ELECTROCARDIOGRAM TRACING: CPT

## 2023-01-26 PROCEDURE — 96374 THER/PROPH/DIAG INJ IV PUSH: CPT

## 2023-01-26 PROCEDURE — 93971 EXTREMITY STUDY: CPT

## 2023-01-26 PROCEDURE — U0003: CPT

## 2023-01-26 PROCEDURE — 84443 ASSAY THYROID STIM HORMONE: CPT

## 2023-01-26 PROCEDURE — 93325 DOPPLER ECHO COLOR FLOW MAPG: CPT

## 2023-01-26 PROCEDURE — 93320 DOPPLER ECHO COMPLETE: CPT

## 2023-01-26 PROCEDURE — 87641 MR-STAPH DNA AMP PROBE: CPT

## 2023-01-26 PROCEDURE — 80053 COMPREHEN METABOLIC PANEL: CPT

## 2023-01-26 PROCEDURE — 82310 ASSAY OF CALCIUM: CPT

## 2023-01-26 PROCEDURE — 80061 LIPID PANEL: CPT

## 2023-01-26 PROCEDURE — 84157 ASSAY OF PROTEIN OTHER: CPT

## 2023-01-26 PROCEDURE — 84145 PROCALCITONIN (PCT): CPT

## 2023-01-26 PROCEDURE — 87102 FUNGUS ISOLATION CULTURE: CPT

## 2023-01-26 PROCEDURE — 87640 STAPH A DNA AMP PROBE: CPT

## 2023-01-26 PROCEDURE — 84133 ASSAY OF URINE POTASSIUM: CPT

## 2023-01-26 PROCEDURE — 76775 US EXAM ABDO BACK WALL LIM: CPT

## 2023-01-26 PROCEDURE — 87449 NOS EACH ORGANISM AG IA: CPT

## 2023-01-26 PROCEDURE — 93017 CV STRESS TEST TRACING ONLY: CPT

## 2023-01-26 PROCEDURE — 83735 ASSAY OF MAGNESIUM: CPT

## 2023-01-26 PROCEDURE — 82550 ASSAY OF CK (CPK): CPT

## 2023-01-26 PROCEDURE — 99239 HOSP IP/OBS DSCHRG MGMT >30: CPT

## 2023-01-26 PROCEDURE — 84540 ASSAY OF URINE/UREA-N: CPT

## 2023-01-26 PROCEDURE — 82945 GLUCOSE OTHER FLUID: CPT

## 2023-01-26 PROCEDURE — 82306 VITAMIN D 25 HYDROXY: CPT

## 2023-01-26 PROCEDURE — 71045 X-RAY EXAM CHEST 1 VIEW: CPT

## 2023-01-26 PROCEDURE — 85379 FIBRIN DEGRADATION QUANT: CPT

## 2023-01-26 PROCEDURE — 87040 BLOOD CULTURE FOR BACTERIA: CPT

## 2023-01-26 PROCEDURE — 87899 AGENT NOS ASSAY W/OPTIC: CPT

## 2023-01-26 PROCEDURE — 83986 ASSAY PH BODY FLUID NOS: CPT

## 2023-01-26 PROCEDURE — 83615 LACTATE (LD) (LDH) ENZYME: CPT

## 2023-01-26 PROCEDURE — 99285 EMERGENCY DEPT VISIT HI MDM: CPT

## 2023-01-26 PROCEDURE — 83935 ASSAY OF URINE OSMOLALITY: CPT

## 2023-01-26 PROCEDURE — 94640 AIRWAY INHALATION TREATMENT: CPT

## 2023-01-26 PROCEDURE — 71250 CT THORAX DX C-: CPT | Mod: MG

## 2023-01-26 PROCEDURE — 87205 SMEAR GRAM STAIN: CPT

## 2023-01-26 PROCEDURE — 86803 HEPATITIS C AB TEST: CPT

## 2023-01-26 PROCEDURE — 93312 ECHO TRANSESOPHAGEAL: CPT

## 2023-01-26 RX ADMIN — HEPARIN SODIUM 5000 UNIT(S): 5000 INJECTION INTRAVENOUS; SUBCUTANEOUS at 05:59

## 2023-01-26 RX ADMIN — Medication 25 MILLIGRAM(S): at 05:58

## 2023-01-26 NOTE — CHART NOTE - NSCHARTNOTEFT_GEN_A_CORE
Pt s/p bedside cystoscopy, urethral dilation, insertion of difficult goyal catheter  - cont goyal  - d/c home with leg bag  - pt to f/u with Dr. Nicholas in 1 week

## 2023-01-27 ENCOUNTER — NON-APPOINTMENT (OUTPATIENT)
Age: 78
End: 2023-01-27

## 2023-01-27 PROBLEM — Z00.00 ENCOUNTER FOR PREVENTIVE HEALTH EXAMINATION: Status: ACTIVE | Noted: 2023-01-27

## 2023-02-03 ENCOUNTER — APPOINTMENT (OUTPATIENT)
Dept: CARE COORDINATION | Facility: HOME HEALTH | Age: 78
End: 2023-02-03
Payer: SELF-PAY

## 2023-02-03 DIAGNOSIS — Z87.438 PERSONAL HISTORY OF OTHER DISEASES OF MALE GENITAL ORGANS: ICD-10-CM

## 2023-02-03 DIAGNOSIS — Z87.448 PERSONAL HISTORY OF OTHER DISEASES OF URINARY SYSTEM: ICD-10-CM

## 2023-02-03 DIAGNOSIS — Z78.9 OTHER SPECIFIED HEALTH STATUS: ICD-10-CM

## 2023-02-03 DIAGNOSIS — J96.01 ACUTE RESPIRATORY FAILURE WITH HYPOXIA: ICD-10-CM

## 2023-02-03 DIAGNOSIS — Z86.79 PERSONAL HISTORY OF OTHER DISEASES OF THE CIRCULATORY SYSTEM: ICD-10-CM

## 2023-02-03 DIAGNOSIS — N17.9 ACUTE KIDNEY FAILURE, UNSPECIFIED: ICD-10-CM

## 2023-02-03 DIAGNOSIS — Z87.01 PERSONAL HISTORY OF PNEUMONIA (RECURRENT): ICD-10-CM

## 2023-02-03 DIAGNOSIS — J18.9 PNEUMONIA, UNSPECIFIED ORGANISM: ICD-10-CM

## 2023-02-03 DIAGNOSIS — J96.00 ACUTE RESPIRATORY FAILURE, UNSPECIFIED WHETHER WITH HYPOXIA OR HYPERCAPNIA: ICD-10-CM

## 2023-02-03 DIAGNOSIS — Z87.09 PERSONAL HISTORY OF OTHER DISEASES OF THE RESPIRATORY SYSTEM: ICD-10-CM

## 2023-02-03 PROBLEM — J44.9 CHRONIC OBSTRUCTIVE PULMONARY DISEASE, UNSPECIFIED: Chronic | Status: ACTIVE | Noted: 2018-05-11

## 2023-02-03 PROBLEM — Z00.00 ENCOUNTER FOR PREVENTIVE HEALTH EXAMINATION: Status: ACTIVE | Noted: 2023-02-03

## 2023-02-03 PROCEDURE — 99348 HOME/RES VST EST LOW MDM 30: CPT | Mod: 95

## 2023-02-06 PROBLEM — N17.9 AKI (ACUTE KIDNEY INJURY): Status: RESOLVED | Noted: 2023-02-06 | Resolved: 2023-02-06

## 2023-02-06 PROBLEM — Z78.9 CURRENT NON-SMOKER: Status: ACTIVE | Noted: 2023-02-06

## 2023-02-06 PROBLEM — Z87.438 HISTORY OF BENIGN PROSTATIC HYPERPLASIA: Status: RESOLVED | Noted: 2023-02-06 | Resolved: 2023-02-06

## 2023-02-06 PROBLEM — J96.00 ACUTE RESPIRATORY FAILURE: Status: ACTIVE | Noted: 2023-02-06

## 2023-02-06 PROBLEM — Z87.09 HISTORY OF PLEURAL EFFUSION: Status: RESOLVED | Noted: 2023-02-06 | Resolved: 2023-02-06

## 2023-02-06 PROBLEM — Z87.01 HISTORY OF PNEUMONIA: Status: RESOLVED | Noted: 2023-02-06 | Resolved: 2023-02-06

## 2023-02-06 PROBLEM — Z86.79 HISTORY OF CONGESTIVE HEART FAILURE: Status: RESOLVED | Noted: 2023-02-06 | Resolved: 2023-02-06

## 2023-02-06 PROBLEM — Z78.9 DOES NOT USE ILLICIT DRUGS: Status: ACTIVE | Noted: 2023-02-06

## 2023-02-06 PROBLEM — Z78.9 DENIES ALCOHOL CONSUMPTION: Status: ACTIVE | Noted: 2023-02-06

## 2023-02-06 PROBLEM — J18.9 PNA (PNEUMONIA): Status: ACTIVE | Noted: 2023-02-06

## 2023-02-06 PROBLEM — Z87.448 HISTORY OF HYDRONEPHROSIS: Status: RESOLVED | Noted: 2023-02-06 | Resolved: 2023-02-06

## 2023-02-06 PROBLEM — J96.01 ACUTE RESPIRATORY FAILURE WITH HYPOXIA: Status: RESOLVED | Noted: 2023-02-06 | Resolved: 2023-02-06

## 2023-02-06 PROBLEM — Z87.448 HISTORY OF URETHRAL STRICTURE: Status: RESOLVED | Noted: 2023-02-06 | Resolved: 2023-02-06

## 2023-02-06 RX ORDER — ERGOCALCIFEROL 1.25 MG/1
1.25 MG CAPSULE ORAL
Refills: 0 | Status: ACTIVE | COMMUNITY

## 2023-02-06 RX ORDER — METOPROLOL TARTRATE 25 MG/1
25 TABLET, FILM COATED ORAL
Refills: 0 | Status: ACTIVE | COMMUNITY

## 2023-02-06 RX ORDER — TAMSULOSIN HYDROCHLORIDE 0.4 MG/1
0.4 CAPSULE ORAL
Refills: 0 | Status: ACTIVE | COMMUNITY

## 2023-02-06 RX ORDER — FUROSEMIDE 40 MG/1
40 TABLET ORAL
Refills: 0 | Status: ACTIVE | COMMUNITY

## 2023-02-08 ENCOUNTER — APPOINTMENT (OUTPATIENT)
Dept: UROLOGY | Facility: CLINIC | Age: 78
End: 2023-02-08

## 2023-02-08 ENCOUNTER — APPOINTMENT (OUTPATIENT)
Dept: UROLOGY | Facility: CLINIC | Age: 78
End: 2023-02-08
Payer: SELF-PAY

## 2023-02-08 VITALS
OXYGEN SATURATION: 98 % | SYSTOLIC BLOOD PRESSURE: 113 MMHG | RESPIRATION RATE: 16 BRPM | HEART RATE: 91 BPM | DIASTOLIC BLOOD PRESSURE: 52 MMHG

## 2023-02-08 PROCEDURE — 51700 IRRIGATION OF BLADDER: CPT

## 2023-02-08 PROCEDURE — 99203 OFFICE O/P NEW LOW 30 MIN: CPT | Mod: 25

## 2023-02-08 PROCEDURE — A4216: CPT | Mod: NC

## 2023-02-13 ENCOUNTER — APPOINTMENT (OUTPATIENT)
Dept: UROLOGY | Facility: CLINIC | Age: 78
End: 2023-02-13

## 2023-02-18 LAB
CULTURE RESULTS: SIGNIFICANT CHANGE UP
SPECIMEN SOURCE: SIGNIFICANT CHANGE UP

## 2023-03-08 ENCOUNTER — APPOINTMENT (OUTPATIENT)
Dept: UROLOGY | Facility: CLINIC | Age: 78
End: 2023-03-08
Payer: SELF-PAY

## 2023-03-08 VITALS
OXYGEN SATURATION: 98 % | DIASTOLIC BLOOD PRESSURE: 74 MMHG | SYSTOLIC BLOOD PRESSURE: 132 MMHG | RESPIRATION RATE: 18 BRPM | HEART RATE: 81 BPM

## 2023-03-08 PROCEDURE — 51702 INSERT TEMP BLADDER CATH: CPT

## 2023-04-10 ENCOUNTER — APPOINTMENT (OUTPATIENT)
Dept: UROLOGY | Facility: CLINIC | Age: 78
End: 2023-04-10

## 2024-06-26 RX ORDER — TAMSULOSIN HYDROCHLORIDE 0.4 MG/1
1 CAPSULE ORAL
Qty: 0 | Refills: 0 | DISCHARGE

## 2025-03-25 NOTE — CONSULT NOTE ADULT - NS ATTEND AMEND GEN_ALL_CORE FT
Patient is here because for couple days she has had a frontal headache which she describes as an ache.  He has a history of headaches and this is no different than when she has had in the past.  She took 2 Tylenol 325 mg with minimal relief.  She is having no other symptoms.  No visual changes.  No nausea vomiting.  No hearing changes.    Physical exam: Patient is alert and oriented.  Does not appear to be in any distress.  Cranial nerves III to XII are intact.  Eyes are equal and reactive to light and accommodation.  Extraocular motions intact.  Neck without adenopathy.  Patient has minimal pain with pressure over the forehead.    Assessment/plan: Headache.  I recommend patient try Tylenol 500 mg 2 tablets every 6 hours as needed.  Because patient is on a blood thinner we will avoid nonsteroidal anti-inflammatory drugs.  Her headache seems to be mild.  
complicated goyal placement.   Do Not remove goyal until cleared by urology.
77 M hx BPH, ?CKD, current smoker who presents with SOB and leg swelling.  In the ED  very hypoxemic with SpO2 74% on room air. Labs show creatinine 3.05, D-dimer 789, troponin negative and proBNP 6457. EKG shows  bpm with no acute ST/T changes. Pt denies chest pain or palpitations.  1)  Acute CHF: Lasix 40 mg IV bid. Serial CE, EKG, TTE, Pt will need eventual ischemic eval once optimized.   2) Chest CT showed mod bl pleural eff, partially loculated in L. Possible PNA and CHF. Small volume perihepatic and perisplenic ascites. Abx as per PMT. Thorcaic surgery saw pt. POCUS with fluid pocket in right chest. Right pigtail placed, 400 cc serous fluid drained.   3) GERSON/CKD: [facility] Nephrology consultation  4) BPH: Urology on the case